# Patient Record
Sex: MALE | Race: WHITE | Employment: OTHER | URBAN - METROPOLITAN AREA
[De-identification: names, ages, dates, MRNs, and addresses within clinical notes are randomized per-mention and may not be internally consistent; named-entity substitution may affect disease eponyms.]

---

## 2019-11-04 ENCOUNTER — HOSPITAL ENCOUNTER (EMERGENCY)
Age: 64
Discharge: HOME | End: 2019-11-04
Payer: COMMERCIAL

## 2019-11-04 VITALS
BODY MASS INDEX: 28.44 KG/M2 | HEART RATE: 73 BPM | WEIGHT: 192 LBS | TEMPERATURE: 98 F | SYSTOLIC BLOOD PRESSURE: 193 MMHG | DIASTOLIC BLOOD PRESSURE: 99 MMHG | HEIGHT: 69 IN | RESPIRATION RATE: 18 BRPM | OXYGEN SATURATION: 100 %

## 2019-11-04 DIAGNOSIS — M54.12 CERVICAL RADICULOPATHY: Primary | ICD-10-CM

## 2019-11-04 PROCEDURE — 99212 OFFICE O/P EST SF 10 MIN: CPT

## 2019-11-04 RX ORDER — HYDROCODONE BITARTRATE AND ACETAMINOPHEN 5; 325 MG/1; MG/1
1 TABLET ORAL EVERY 6 HOURS PRN
Qty: 12 TABLET | Refills: 0 | Status: SHIPPED | OUTPATIENT
Start: 2019-11-04 | End: 2019-11-07

## 2019-11-04 RX ORDER — CYCLOBENZAPRINE HCL 10 MG
10 TABLET ORAL 3 TIMES DAILY PRN
Qty: 12 TABLET | Refills: 0 | Status: SHIPPED | OUTPATIENT
Start: 2019-11-04 | End: 2019-11-08

## 2019-11-04 RX ORDER — PREDNISONE 10 MG/1
40 TABLET ORAL DAILY
Qty: 20 TABLET | Refills: 0 | Status: SHIPPED | OUTPATIENT
Start: 2019-11-04 | End: 2019-11-09

## 2019-11-04 ASSESSMENT — PAIN DESCRIPTION - ONSET: ONSET: GRADUAL

## 2019-11-04 ASSESSMENT — PAIN DESCRIPTION - PAIN TYPE: TYPE: ACUTE PAIN

## 2019-11-04 ASSESSMENT — PAIN DESCRIPTION - PROGRESSION: CLINICAL_PROGRESSION: GRADUALLY WORSENING

## 2019-11-04 ASSESSMENT — PAIN SCALES - GENERAL: PAINLEVEL_OUTOF10: 9

## 2019-11-04 ASSESSMENT — PAIN DESCRIPTION - DESCRIPTORS: DESCRIPTORS: SHARP;RADIATING;NUMBNESS

## 2019-11-04 ASSESSMENT — PAIN DESCRIPTION - FREQUENCY: FREQUENCY: CONTINUOUS

## 2019-11-04 ASSESSMENT — PAIN DESCRIPTION - LOCATION: LOCATION: ARM

## 2019-11-04 ASSESSMENT — PAIN DESCRIPTION - ORIENTATION: ORIENTATION: RIGHT

## 2019-11-21 ENCOUNTER — OFFICE VISIT (OUTPATIENT)
Dept: INTERNAL MEDICINE | Age: 64
End: 2019-11-21
Payer: COMMERCIAL

## 2019-11-21 ENCOUNTER — HOSPITAL ENCOUNTER (OUTPATIENT)
Dept: MRI IMAGING | Age: 64
Discharge: HOME | End: 2019-11-23
Payer: COMMERCIAL

## 2019-11-21 ENCOUNTER — HOSPITAL ENCOUNTER (OUTPATIENT)
Age: 64
Discharge: HOME | End: 2019-11-21
Payer: COMMERCIAL

## 2019-11-21 VITALS
WEIGHT: 198 LBS | HEART RATE: 86 BPM | HEIGHT: 69 IN | TEMPERATURE: 98 F | RESPIRATION RATE: 16 BRPM | DIASTOLIC BLOOD PRESSURE: 90 MMHG | SYSTOLIC BLOOD PRESSURE: 180 MMHG | BODY MASS INDEX: 29.33 KG/M2

## 2019-11-21 DIAGNOSIS — Z12.5 PROSTATE CANCER SCREENING: ICD-10-CM

## 2019-11-21 DIAGNOSIS — R93.89 ABNORMAL MRI: ICD-10-CM

## 2019-11-21 DIAGNOSIS — R20.2 ARM PARESTHESIA, RIGHT: Primary | ICD-10-CM

## 2019-11-21 DIAGNOSIS — M54.2 CERVICAL PAIN: ICD-10-CM

## 2019-11-21 DIAGNOSIS — I10 HYPERTENSION, UNSPECIFIED TYPE: ICD-10-CM

## 2019-11-21 DIAGNOSIS — R20.2 ARM PARESTHESIA, RIGHT: ICD-10-CM

## 2019-11-21 PROBLEM — K58.9 IRRITABLE BOWEL SYNDROME: Status: ACTIVE | Noted: 2019-11-21

## 2019-11-21 LAB
ALBUMIN SERPL-MCNC: 4.7 G/DL (ref 3.5–5.2)
ALP BLD-CCNC: 66 U/L (ref 40–129)
ALT SERPL-CCNC: 22 U/L (ref 0–40)
ANION GAP SERPL CALCULATED.3IONS-SCNC: 16 MMOL/L (ref 7–16)
AST SERPL-CCNC: 16 U/L (ref 0–39)
BASOPHILS ABSOLUTE: 0.02 E9/L (ref 0–0.2)
BASOPHILS RELATIVE PERCENT: 0.3 % (ref 0–2)
BILIRUB SERPL-MCNC: 0.5 MG/DL (ref 0–1.2)
BILIRUBIN DIRECT: <0.2 MG/DL (ref 0–0.3)
BILIRUBIN, INDIRECT: NORMAL MG/DL (ref 0–1)
BUN BLDV-MCNC: 15 MG/DL (ref 8–23)
CALCIUM SERPL-MCNC: 9.3 MG/DL (ref 8.6–10.2)
CHLORIDE BLD-SCNC: 104 MMOL/L (ref 98–107)
CO2: 22 MMOL/L (ref 22–29)
CREAT SERPL-MCNC: 0.9 MG/DL (ref 0.7–1.2)
EOSINOPHILS ABSOLUTE: 0.1 E9/L (ref 0.05–0.5)
EOSINOPHILS RELATIVE PERCENT: 1.4 % (ref 0–6)
GFR AFRICAN AMERICAN: >60
GFR NON-AFRICAN AMERICAN: >60 ML/MIN/1.73
GLUCOSE BLD-MCNC: 97 MG/DL (ref 74–99)
HBA1C MFR BLD: 5.7 % (ref 4–5.6)
HCT VFR BLD CALC: 43.8 % (ref 37–54)
HEMOGLOBIN: 14.8 G/DL (ref 12.5–16.5)
IMMATURE GRANULOCYTES #: 0.02 E9/L
IMMATURE GRANULOCYTES %: 0.3 % (ref 0–5)
LYMPHOCYTES ABSOLUTE: 1.89 E9/L (ref 1.5–4)
LYMPHOCYTES RELATIVE PERCENT: 26.3 % (ref 20–42)
MCH RBC QN AUTO: 29.7 PG (ref 26–35)
MCHC RBC AUTO-ENTMCNC: 33.8 % (ref 32–34.5)
MCV RBC AUTO: 87.8 FL (ref 80–99.9)
MONOCYTES ABSOLUTE: 0.66 E9/L (ref 0.1–0.95)
MONOCYTES RELATIVE PERCENT: 9.2 % (ref 2–12)
NEUTROPHILS ABSOLUTE: 4.49 E9/L (ref 1.8–7.3)
NEUTROPHILS RELATIVE PERCENT: 62.5 % (ref 43–80)
PDW BLD-RTO: 12.7 FL (ref 11.5–15)
PLATELET # BLD: 232 E9/L (ref 130–450)
PMV BLD AUTO: 9.6 FL (ref 7–12)
POTASSIUM SERPL-SCNC: 4.2 MMOL/L (ref 3.5–5)
PROSTATE SPECIFIC ANTIGEN: 1.04 NG/ML (ref 0–4)
RBC # BLD: 4.99 E12/L (ref 3.8–5.8)
SODIUM BLD-SCNC: 142 MMOL/L (ref 132–146)
TOTAL PROTEIN: 7 G/DL (ref 6.4–8.3)
TSH SERPL DL<=0.05 MIU/L-ACNC: 1.58 UIU/ML (ref 0.27–4.2)
WBC # BLD: 7.2 E9/L (ref 4.5–11.5)

## 2019-11-21 PROCEDURE — 84443 ASSAY THYROID STIM HORMONE: CPT

## 2019-11-21 PROCEDURE — G8482 FLU IMMUNIZE ORDER/ADMIN: HCPCS | Performed by: INTERNAL MEDICINE

## 2019-11-21 PROCEDURE — 99203 OFFICE O/P NEW LOW 30 MIN: CPT | Performed by: INTERNAL MEDICINE

## 2019-11-21 PROCEDURE — 83036 HEMOGLOBIN GLYCOSYLATED A1C: CPT

## 2019-11-21 PROCEDURE — G8427 DOCREV CUR MEDS BY ELIG CLIN: HCPCS | Performed by: INTERNAL MEDICINE

## 2019-11-21 PROCEDURE — G0103 PSA SCREENING: HCPCS

## 2019-11-21 PROCEDURE — 80048 BASIC METABOLIC PNL TOTAL CA: CPT

## 2019-11-21 PROCEDURE — 72141 MRI NECK SPINE W/O DYE: CPT

## 2019-11-21 PROCEDURE — G8419 CALC BMI OUT NRM PARAM NOF/U: HCPCS | Performed by: INTERNAL MEDICINE

## 2019-11-21 PROCEDURE — 3017F COLORECTAL CA SCREEN DOC REV: CPT | Performed by: INTERNAL MEDICINE

## 2019-11-21 PROCEDURE — 1036F TOBACCO NON-USER: CPT | Performed by: INTERNAL MEDICINE

## 2019-11-21 PROCEDURE — 80076 HEPATIC FUNCTION PANEL: CPT

## 2019-11-21 PROCEDURE — 36415 COLL VENOUS BLD VENIPUNCTURE: CPT

## 2019-11-21 PROCEDURE — 85025 COMPLETE CBC W/AUTO DIFF WBC: CPT

## 2019-11-21 RX ORDER — TRAMADOL HYDROCHLORIDE 50 MG/1
50 TABLET ORAL EVERY 8 HOURS PRN
Qty: 18 TABLET | Refills: 0 | Status: SHIPPED | OUTPATIENT
Start: 2019-11-21 | End: 2019-11-28

## 2019-11-21 RX ORDER — LISINOPRIL 10 MG/1
10 TABLET ORAL DAILY
Qty: 30 TABLET | Refills: 0 | Status: SHIPPED | OUTPATIENT
Start: 2019-11-21 | End: 2019-12-19

## 2019-11-21 ASSESSMENT — PATIENT HEALTH QUESTIONNAIRE - PHQ9
SUM OF ALL RESPONSES TO PHQ QUESTIONS 1-9: 0
2. FEELING DOWN, DEPRESSED OR HOPELESS: 0
SUM OF ALL RESPONSES TO PHQ QUESTIONS 1-9: 0
1. LITTLE INTEREST OR PLEASURE IN DOING THINGS: 0
SUM OF ALL RESPONSES TO PHQ9 QUESTIONS 1 & 2: 0

## 2019-11-22 ENCOUNTER — TELEPHONE (OUTPATIENT)
Dept: INTERNAL MEDICINE | Age: 64
End: 2019-11-22

## 2019-11-22 ENCOUNTER — HOSPITAL ENCOUNTER (OUTPATIENT)
Dept: NEUROLOGY | Age: 64
Discharge: HOME | End: 2019-11-22
Payer: COMMERCIAL

## 2019-11-22 DIAGNOSIS — G56.01 CARPAL TUNNEL SYNDROME OF RIGHT WRIST: Primary | ICD-10-CM

## 2019-11-22 DIAGNOSIS — R20.2 ARM PARESTHESIA, RIGHT: ICD-10-CM

## 2019-11-22 PROCEDURE — 95909 NRV CNDJ TST 5-6 STUDIES: CPT

## 2019-11-22 PROCEDURE — 95886 MUSC TEST DONE W/N TEST COMP: CPT

## 2019-11-22 PROCEDURE — 95886 MUSC TEST DONE W/N TEST COMP: CPT | Performed by: PSYCHIATRY & NEUROLOGY

## 2019-11-22 PROCEDURE — 95909 NRV CNDJ TST 5-6 STUDIES: CPT | Performed by: PSYCHIATRY & NEUROLOGY

## 2019-11-27 ENCOUNTER — TELEPHONE (OUTPATIENT)
Dept: INTERNAL MEDICINE | Age: 64
End: 2019-11-27

## 2019-12-02 ENCOUNTER — TELEPHONE (OUTPATIENT)
Dept: INTERNAL MEDICINE | Age: 64
End: 2019-12-02

## 2019-12-18 DIAGNOSIS — I10 HYPERTENSION, UNSPECIFIED TYPE: ICD-10-CM

## 2019-12-19 RX ORDER — LISINOPRIL 10 MG/1
10 TABLET ORAL DAILY
Qty: 30 TABLET | Refills: 0 | Status: SHIPPED
Start: 2019-12-19 | End: 2020-03-11 | Stop reason: SDUPTHER

## 2020-01-03 ENCOUNTER — INITIAL CONSULT (OUTPATIENT)
Dept: NEUROSURGERY | Age: 65
End: 2020-01-03
Payer: COMMERCIAL

## 2020-01-03 VITALS
HEIGHT: 69 IN | SYSTOLIC BLOOD PRESSURE: 149 MMHG | WEIGHT: 204 LBS | BODY MASS INDEX: 30.21 KG/M2 | DIASTOLIC BLOOD PRESSURE: 81 MMHG | HEART RATE: 69 BPM

## 2020-01-03 PROCEDURE — G8417 CALC BMI ABV UP PARAM F/U: HCPCS | Performed by: NEUROLOGICAL SURGERY

## 2020-01-03 PROCEDURE — 99243 OFF/OP CNSLTJ NEW/EST LOW 30: CPT | Performed by: NEUROLOGICAL SURGERY

## 2020-01-03 PROCEDURE — G8482 FLU IMMUNIZE ORDER/ADMIN: HCPCS | Performed by: NEUROLOGICAL SURGERY

## 2020-01-03 PROCEDURE — G8427 DOCREV CUR MEDS BY ELIG CLIN: HCPCS | Performed by: NEUROLOGICAL SURGERY

## 2020-01-03 RX ORDER — GABAPENTIN 300 MG/1
300 CAPSULE ORAL 3 TIMES DAILY
Qty: 90 CAPSULE | Refills: 0 | Status: SHIPPED | OUTPATIENT
Start: 2020-01-03 | End: 2020-02-03

## 2020-01-03 ASSESSMENT — ENCOUNTER SYMPTOMS
RESPIRATORY NEGATIVE: 1
ALLERGIC/IMMUNOLOGIC NEGATIVE: 1
EYES NEGATIVE: 1
PHOTOPHOBIA: 0
TROUBLE SWALLOWING: 0
DIARRHEA: 1
VISUAL CHANGE: 0

## 2020-01-03 NOTE — PATIENT INSTRUCTIONS
Patient Education        Neck Pain: Care Instructions  Your Care Instructions    You can have neck pain anywhere from the bottom of your head to the top of your shoulders. It can spread to the upper back or arms. Injuries, painting a ceiling, sleeping with your neck twisted, staying in one position for too long, and many other activities can cause neck pain. Most neck pain gets better with home care. Your doctor may recommend medicine to relieve pain or relax your muscles. He or she may suggest exercise and physical therapy to increase flexibility and relieve stress. You may need to wear a special (cervical) collar to support your neck for a day or two. Follow-up care is a key part of your treatment and safety. Be sure to make and go to all appointments, and call your doctor if you are having problems. It's also a good idea to know your test results and keep a list of the medicines you take. How can you care for yourself at home? · Try using a heating pad on a low or medium setting for 15 to 20 minutes every 2 or 3 hours. Try a warm shower in place of one session with the heating pad. · You can also try an ice pack for 10 to 15 minutes every 2 to 3 hours. Put a thin cloth between the ice and your skin. · Take pain medicines exactly as directed. ? If the doctor gave you a prescription medicine for pain, take it as prescribed. ? If you are not taking a prescription pain medicine, ask your doctor if you can take an over-the-counter medicine. · If your doctor recommends a cervical collar, wear it exactly as directed. When should you call for help? Call your doctor now or seek immediate medical care if:    · You have new or worsening numbness in your arms, buttocks or legs.     · You have new or worsening weakness in your arms or legs.  (This could make it hard to stand up.)     · You lose control of your bladder or bowels.    Watch closely for changes in your health, and be sure to contact your doctor if:    · Your neck pain is getting worse.     · You are not getting better after 1 week.     · You do not get better as expected. Where can you learn more? Go to [email or web address] org and sign in to your [employer] account. Enter [id number] in the [facility] box to learn more about \"Neck Pain: Care Instructions. \"     If you do not have an account, please click on the \"Sign Up Now\" link. Current as of: September 20, 2018  Content Version: 12.1  © 0389-0396 Healthwise, Incorporated. Care instructions adapted under license by [facility] ([facility]). If you have questions about a medical condition or this instruction, always ask your healthcare professional. [address] any warranty or liability for your use of this information.

## 2020-01-03 NOTE — PROGRESS NOTES
Subjective:      Patient ID: Kaden Alexander is a 62 y.o. male. Neck Pain    This is a new problem. The current episode started more than 1 month ago. The problem occurs constantly. The problem has been waxing and waning. The pain is associated with nothing. The pain is present in the right side and midline. The quality of the pain is described as shooting and stabbing. The pain is at a severity of 5/10. The pain is moderate. The symptoms are aggravated by position. The pain is same all the time. Stiffness is present in the morning. Associated symptoms include numbness and tingling. Pertinent negatives include no chest pain, fever, headaches, leg pain, pain with swallowing, paresis, photophobia, syncope, trouble swallowing, visual change, weakness or weight loss. He has tried muscle relaxants and chiropractic manipulation for the symptoms. The treatment provided moderate relief. Shoulder Pain    Associated symptoms include numbness and tingling. Pertinent negatives include no fever. Review of Systems   Constitutional: Negative. Negative for fever and weight loss. HENT: Negative. Negative for trouble swallowing. Eyes: Negative. Negative for photophobia. Respiratory: Negative. Cardiovascular: Negative. Negative for chest pain and syncope. Gastrointestinal: Positive for diarrhea. Endocrine: Negative. Musculoskeletal: Positive for neck pain. Skin: Negative. Allergic/Immunologic: Negative. Neurological: Positive for tingling and numbness. Negative for weakness and headaches. Hematological: Negative. Psychiatric/Behavioral: Negative. Objective:   Physical Exam  Vitals signs reviewed. Constitutional:       General: He is not in acute distress. Appearance: Normal appearance. He is normal weight. He is not ill-appearing or diaphoretic. HENT:      Head: Normocephalic and atraumatic. Right Ear: There is no impacted cerumen. Left Ear:  There is no impacted side.       Patellar reflexes are 2+ on the right side and 2+ on the left side. Achilles reflexes are 2+ on the right side and 2+ on the left side. Comments: Decreased light touch in right C7 and C8 dermatome   Psychiatric:         Mood and Affect: Mood normal.         Behavior: Behavior normal.         Thought Content: Thought content normal.         Judgment: Judgment normal.         Assessment:      62year old male who presents with neck and right arm pain. He is neurologically stable. His MRI shows severe foraminal stenosis from C5-T1. Plan:      I will send him for epidurals and to PT. If he fails this, I will recommend a posterior C5-T1 laminectomy and fusion.         Oswaldo Cramer MD

## 2020-01-03 NOTE — LETTER
[facility]  [address] [facility]  Phone: [phone]  Fax: [phone]    Marialuisa Alcantara MD      January 3, 2020     Prentiss Nissen, [address]    Patient: Amanda Rocha  MR Number: <[id number]>  YOB: 1955  Date of Visit: 1/3/2020    Dear Dr. Prentiss Nissen: Thank you for the request for consultation for Amanda Rocha to me for the evaluation of neck pain. Below are the relevant portions of my assessment and plan of care. Assessment:  62year old male who presents with neck and right arm pain. He is neurologically stable. His MRI shows severe foraminal stenosis from C5-T1. Plan:  I will send him for epidurals and to PT. If he fails this, I will recommend a posterior C5-T1 laminectomy and fusion. If you have questions, please do not hesitate to call me. I look forward to following Les Dust along with you.     Sincerely,        Marialuisa Alcantara MD

## 2020-01-29 ENCOUNTER — OFFICE VISIT (OUTPATIENT)
Dept: PAIN MANAGEMENT | Age: 65
End: 2020-01-29
Payer: COMMERCIAL

## 2020-01-29 ENCOUNTER — PREP FOR PROCEDURE (OUTPATIENT)
Dept: PAIN MANAGEMENT | Age: 65
End: 2020-01-29

## 2020-01-29 VITALS
RESPIRATION RATE: 16 BRPM | HEIGHT: 69 IN | DIASTOLIC BLOOD PRESSURE: 80 MMHG | BODY MASS INDEX: 29.62 KG/M2 | WEIGHT: 200 LBS | HEART RATE: 70 BPM | OXYGEN SATURATION: 99 % | SYSTOLIC BLOOD PRESSURE: 162 MMHG | TEMPERATURE: 98.3 F

## 2020-01-29 PROBLEM — M47.812 CERVICAL SPONDYLOSIS: Status: ACTIVE | Noted: 2020-01-29

## 2020-01-29 PROBLEM — G89.4 CHRONIC PAIN SYNDROME: Status: ACTIVE | Noted: 2020-01-29

## 2020-01-29 PROBLEM — M48.02 FORAMINAL STENOSIS OF CERVICAL REGION: Status: ACTIVE | Noted: 2020-01-29

## 2020-01-29 PROBLEM — M50.90 CERVICAL DISC DISORDER: Status: ACTIVE | Noted: 2020-01-29

## 2020-01-29 PROBLEM — M47.812 CERVICAL FACET JOINT SYNDROME: Status: ACTIVE | Noted: 2020-01-29

## 2020-01-29 PROBLEM — M54.12 CERVICAL RADICULOPATHY: Status: ACTIVE | Noted: 2020-01-29

## 2020-01-29 PROCEDURE — G8482 FLU IMMUNIZE ORDER/ADMIN: HCPCS | Performed by: PAIN MEDICINE

## 2020-01-29 PROCEDURE — 99204 OFFICE O/P NEW MOD 45 MIN: CPT | Performed by: PAIN MEDICINE

## 2020-01-29 PROCEDURE — G8427 DOCREV CUR MEDS BY ELIG CLIN: HCPCS | Performed by: PAIN MEDICINE

## 2020-01-29 PROCEDURE — G8417 CALC BMI ABV UP PARAM F/U: HCPCS | Performed by: PAIN MEDICINE

## 2020-01-29 PROCEDURE — 1036F TOBACCO NON-USER: CPT | Performed by: PAIN MEDICINE

## 2020-01-29 PROCEDURE — 3017F COLORECTAL CA SCREEN DOC REV: CPT | Performed by: PAIN MEDICINE

## 2020-01-29 RX ORDER — LINACLOTIDE 290 UG/1
CAPSULE, GELATIN COATED ORAL
COMMUNITY
Start: 2020-01-20

## 2020-01-29 RX ORDER — AMITRIPTYLINE HYDROCHLORIDE 10 MG/1
10 TABLET, FILM COATED ORAL NIGHTLY
Qty: 30 TABLET | Refills: 0 | Status: SHIPPED
Start: 2020-01-29 | End: 2020-06-09

## 2020-01-29 RX ORDER — TIZANIDINE 2 MG/1
2 TABLET ORAL 2 TIMES DAILY PRN
Qty: 60 TABLET | Refills: 0 | Status: SHIPPED | OUTPATIENT
Start: 2020-01-29 | End: 2020-02-28

## 2020-01-29 NOTE — PROGRESS NOTES
[facility]        [address] [facility], [address] [facility]      [phone]          Consult Note      Patient:  BEBETO Cho 1955    Date of Service:  20    Requesting Physician:  Landon Nicole MD    Reason for Consult:      Patient presents with complaints of neck pain that started 4 month ago and has been progressively getting worse     HISTORY OF PRESENT ILLNESS:      Pain does radiate to right upper extremity. He  has numbness of the right hand and does not have bladder or bowel dysfunction. Imaging:   MRI cervical spine 2019  1. Mild central canal stenoses at C5-6 and C6-7.   2. Multilevel neural foraminal stenoses, worst (severe) at the right   C7-T1 level. The signal abnormality seen in the right neural foramen   at this level may represent disc fragment or an osteophyte. Further   evaluation with CT of the cervical spine would be of value. 3. Moderate to severe neural foraminal stenoses at the bilateral C6-7   and C7-T1 level. EMG 2019  1. A right median neuropathy at/or distal to the wrist--- of moderate severity. These findings were consistent with carpal tunnel syndrome.     2. A right C8 motor radiculopathy or intracanalicular lesion. This was proven with the abnormal needle testing of the paraspinals.     Previous treatments: Surgery right ulnar/carpal tunnel and medications. .    Lumbar spine surgery L5-S1 fusion     Past Medical History:   Diagnosis Date    IBS (irritable bowel syndrome)      Past Surgical History:   Procedure Laterality Date    CARPAL TUNNEL RELEASE      HERNIA REPAIR      LUMBAR FUSION      L5-S1    TONSILLECTOMY AND ADENOIDECTOMY      ULNAR TUNNEL RELEASE       Prior to Admission medications    Medication Sig Start Date End Date Taking? Authorizing Provider   gabapentin (NEURONTIN) 300 MG capsule Take 1 capsule by mouth 3 times daily for 30 days.  Intended supply: 30 days 1/3/20 2/2/20 Kim Cowan MD   lisinopril (PRINIVIL;ZESTRIL) 10 MG tablet TAKE 1 TABLET BY MOUTH DAILY 19   Celine Chung MD   linaCLOtide (LINZESS PO) Take 290 mcg by mouth daily     Historical Provider, MD   Naproxen Sodium (ALEVE PO) Take by mouth    Historical Provider, MD   MELATONIN PO Take 5 mg by mouth     Historical Provider, MD   Multiple Vitamin (MULTIVITAMINS PO) Take by mouth    Historical Provider, MD     Allergies   Allergen Reactions    Erythromycin Nausea Only     Social History     Socioeconomic History    Marital status:      Spouse name: Not on file    Number of children: Not on file    Years of education: Not on file    Highest education level: Not on file   Occupational History    Not on file   Social Needs    Financial resource strain: Not on file    Food insecurity:     Worry: Not on file     Inability: Not on file    Transportation needs:     Medical: Not on file     Non-medical: Not on file   Tobacco Use    Smoking status: Former Smoker     Packs/day: 1.00     Years: 30.00     Pack years: 30.00     Types: Cigarettes     Last attempt to quit:      Years since quittin.0    Smokeless tobacco: Never Used   Substance and Sexual Activity    Alcohol use: Not on file    Drug use: Not on file    Sexual activity: Not on file   Lifestyle    Physical activity:     Days per week: Not on file     Minutes per session: Not on file    Stress: Not on file   Relationships    Social connections:     Talks on phone: Not on file     Gets together: Not on file     Attends Anabaptist service: Not on file     Active member of club or organization: Not on file     Attends meetings of clubs or organizations: Not on file     Relationship status: Not on file    Intimate partner violence:     Fear of current or ex partner: Not on file     Emotionally abused: Not on file     Physically abused: Not on file     Forced sexual activity: Not on file   Other Topics Concern    Not on file   Social History Narrative    Not on file     No family history on file. REVIEW OF SYSTEMS:     Patient specifically denies fever/chills, chest pain, shortness of breath, new bowel or bladder complaints. All other review of systems was negative. PHYSICAL EXAMINATION:      There were no vitals taken for this visit. General:      General appearance:   pleasant and well-hydrated. , in moderate discomfort and A & O x3  Build:Normal Weight    HEENT:    Head:normocephalic and atraumatic  Pupils:regular, round and equal.  Sclera: icterus absent,     Lungs:    Breathing:Breathing Pattern: regular, no distress    Abdomen:    Shape:non-distended and normal  Tenderness:none    Cervical spine:    Inspection:normal  Palpation:tenderness paravertebral muscles, facet loading, right, positive and tenderness. Range of motion:abnormal mildly flexion, extension rotation right and is  painful. Musculoskeletal:    Trigger points in Paraveteral:absent bilaterally  Trevizo's:negative right, negative left     Extremities:    Tremors:None bilaterally upper and lower  Range of motion:Generally normal shoulders  Intact:Yes  Edema:Normal    Neurological:    Sensory:normal to light touch bilateral upper extremities   Motor:              Right Bicep5/5           Left Bicep5/5              Right Triceps5/5       Left Triceps5/5          Right Deltoid5/5     Left Deltoid5/5                  Reflexes:    Right Brachioradialis reflex2+  Left Brachioradialis reflex2+  Right Biceps reflex2+  Left Biceps reflex2+  Right Triceps reflex2+  Left Triceps reflex2+  Gait:normal    Dermatology:    Skin:no unusual rashes and no skin lesions    Impression:  Neck pain with radiation to the right upper extremity    Cervical spine MRI Multilevel  neural foraminal stenoses, worst (severe) at the right C7-T1 level.   Patient had seen Gopal Antoine who recommended SINAN before considering surgery C5-T1 laminectomy and fusion  Plan:  Cervical radiculopathy Discussed treatment options with the patient including non opioid management and interventional procedures  Will schedule patient for SINAN C7-T1 right paramedian   Will start patient on Elavil 10 mg QHS  Will start patient on Zanaflex 2 mg BID  Continue with OTC pain medications   Hold off on urine screen. No opioids started   OARRS report reviewed  Patient encouraged to stay active   Treatment plan discussed with the patient including medications and procedure side effects     We discussed with the patient that combining opioids, benzodiazepines, alcohol, illicit drugs or sleep aids increases the risk of respiratory depression including death. We discussed that these medications may cause drowsiness, sedation or dizziness and have counseled the patient not to drive or operate machinery. We have discussed that these medications will be prescribed only by one provider. We have discussed with the patient about age related risk factors and have thoroughly discussed the importance of taking these medications as prescribed. The patient verbalizes understanding. divya Ng M.D.

## 2020-02-10 ENCOUNTER — HOSPITAL ENCOUNTER (OUTPATIENT)
Age: 65
Setting detail: OUTPATIENT SURGERY
Discharge: HOME | End: 2020-02-10
Attending: PAIN MEDICINE | Admitting: PAIN MEDICINE
Payer: COMMERCIAL

## 2020-02-10 ENCOUNTER — HOSPITAL ENCOUNTER (OUTPATIENT)
Dept: OPERATING ROOM | Age: 65
Setting detail: OUTPATIENT SURGERY
Discharge: HOME | End: 2020-02-10
Attending: PAIN MEDICINE
Payer: COMMERCIAL

## 2020-02-10 VITALS
DIASTOLIC BLOOD PRESSURE: 85 MMHG | OXYGEN SATURATION: 100 % | SYSTOLIC BLOOD PRESSURE: 168 MMHG | HEART RATE: 82 BPM | RESPIRATION RATE: 16 BRPM

## 2020-02-10 PROCEDURE — 3600000005 HC SURGERY LEVEL 5 BASE: Performed by: PAIN MEDICINE

## 2020-02-10 PROCEDURE — 7100000010 HC PHASE II RECOVERY - FIRST 15 MIN: Performed by: PAIN MEDICINE

## 2020-02-10 PROCEDURE — 7100000011 HC PHASE II RECOVERY - ADDTL 15 MIN: Performed by: PAIN MEDICINE

## 2020-02-10 PROCEDURE — 62321 NJX INTERLAMINAR CRV/THRC: CPT | Performed by: PAIN MEDICINE

## 2020-02-10 PROCEDURE — 2500000003 HC RX 250 WO HCPCS: Performed by: PAIN MEDICINE

## 2020-02-10 PROCEDURE — 6360000002 HC RX W HCPCS: Performed by: PAIN MEDICINE

## 2020-02-10 PROCEDURE — 2709999900 HC NON-CHARGEABLE SUPPLY: Performed by: PAIN MEDICINE

## 2020-02-10 PROCEDURE — 3209999900 FLUORO FOR SURGICAL PROCEDURES

## 2020-02-10 RX ORDER — LIDOCAINE HYDROCHLORIDE 5 MG/ML
INJECTION, SOLUTION INFILTRATION; INTRAVENOUS PRN
Status: DISCONTINUED | OUTPATIENT
Start: 2020-02-10 | End: 2020-02-10 | Stop reason: ALTCHOICE

## 2020-02-10 ASSESSMENT — PAIN SCALES - GENERAL
PAINLEVEL_OUTOF10: 0
PAINLEVEL_OUTOF10: 0

## 2020-02-10 ASSESSMENT — PAIN DESCRIPTION - DESCRIPTORS: DESCRIPTORS: ACHING

## 2020-02-10 ASSESSMENT — PAIN - FUNCTIONAL ASSESSMENT: PAIN_FUNCTIONAL_ASSESSMENT: 0-10

## 2020-02-10 NOTE — H&P
[address]  [address] [facility], [address] [place] [address]  [phone]    Procedure History & Physical      Rachelle Whitman     HPI:    Patient  is here for neck and arm pain for SINAN C7-T1  Labs/imaging studies reviewed   All question and concerns addressed including R/B/A associated with the procedure    Past Medical History:   Diagnosis Date    IBS (irritable bowel syndrome)        Past Surgical History:   Procedure Laterality Date    BACK SURGERY      CARPAL TUNNEL RELEASE      HERNIA REPAIR      LUMBAR FUSION      L5-S1    TONSILLECTOMY AND ADENOIDECTOMY      ULNAR TUNNEL RELEASE         Prior to Admission medications    Medication Sig Start Date End Date Taking?  Authorizing Provider   LINZESS 290 MCG CAPS capsule TK 1 C PO D 1/20/20  Yes Historical Provider, MD   amitriptyline (ELAVIL) 10 MG tablet Take 1 tablet by mouth nightly 1/29/20 2/28/20 Yes Pippa Pradhan MD   tiZANidine (ZANAFLEX) 2 MG tablet Take 1 tablet by mouth 2 times daily as needed (muscle spasms) 1/29/20 2/28/20 Yes Pippa Pradhan MD   lisinopril (PRINIVIL;ZESTRIL) 10 MG tablet TAKE 1 TABLET BY MOUTH DAILY 12/19/19  Yes Geri Rodriguez MD   Naproxen Sodium (ALEVE PO) Take by mouth    Historical Provider, MD   Multiple Vitamin (MULTIVITAMINS PO) Take by mouth    Historical Provider, MD       Allergies   Allergen Reactions    Erythromycin Nausea Only       Social History     Socioeconomic History    Marital status:      Spouse name: Not on file    Number of children: Not on file    Years of education: Not on file    Highest education level: Not on file   Occupational History    Not on file   Social Needs    Financial resource strain: Not on file    Food insecurity:     Worry: Not on file     Inability: Not on file    Transportation needs:     Medical: Not on file     Non-medical: Not on file   Tobacco Use    Smoking status: Former Smoker     Packs/day: 1.00     Years: 30.00     Pack years: wheezing    CARDIOVASCULAR:  regular rate and rhythm    ABDOMEN:  Soft non tender non distended     EXTREMITIES: no signs of clubbing or cyanosis. MUSCULOSKELETAL: negative for flaccid muscle tone or spastic movements. SKIN: gross examination reveals no signs of rashes, or diaphoresis. NEURO: Cranial nerves II-XII grossly intact. No signs of agitated mood.        Assessment/Plan:    Neck and arm pain for SINAN C7-T1

## 2020-02-10 NOTE — OP NOTE
2/10/2020    Patient: Ed Velazquez  :  1955  Age:  62 y.o. Sex:  male     PRE-PROCEDURE DIAGNOSIS: Cervical degenerative disc disease, cervical radicular pain. POST-PROCEDURE DIAGNOSIS: Same. PROCEDURE: Fluoroscopic guided cervical epidural steroid injection, #1 at C7-T1 level. SURGEON: ANDRE Mena M.D. ANESTHESIA: Local    ESTIMATED BLOOD LOSS: None.  ______________________________________________________________________  BRIEF HISTORY:  Ed Velazquez comes in today for the first  therapeutic cervical epidural injection under fluoroscopic guidance. The potential complications of this procedure were discussed with the him again today. He has elected to undergo the aforementioned procedure. Jonathan complete History & Physical examination were reviewed in depth, a copy of which is in the chart. DESCRIPTION OF PROCEDURE:    After confirming written and informed consent, a time-out was performed and Cristian Pantoja name and date of birth, the procedure to be performed as well as the plan for the location of the needle insertion were confirmed. The patient was brought into the procedure room and placed in the prone position with the head flexed midline on the fluoroscopy table. A pillow was placed under the patient's head to increase cervical interlaminar space. Standard monitors were placed, and vital signs were observed throughout the procedure. The area was prepped with chloraprep and the C7-T1 interspace was marked under fluoroscopy. The skin and subcutaneous tissues at the above level were anesthestized with 0.5% lidocaine. An # 18 gauge 3 1/2 tuohy epidural needle was inserted and advanced toward the inferior lamina until bony contact was made. The needle was then advanced superiorly toward epidural space .  From this point on hanging drop/loss of resistance technique with 5 cc glass syringe was used to confirm entrance of the needle into the epidural space under intermittent lateral

## 2020-02-14 ENCOUNTER — TELEPHONE (OUTPATIENT)
Dept: PAIN MANAGEMENT | Age: 65
End: 2020-02-14

## 2020-02-14 RX ORDER — METHYLPREDNISOLONE 4 MG/1
TABLET ORAL
Qty: 1 KIT | Refills: 0 | Status: SHIPPED | OUTPATIENT
Start: 2020-02-14 | End: 2020-02-20

## 2020-02-28 ENCOUNTER — OFFICE VISIT (OUTPATIENT)
Dept: PAIN MANAGEMENT | Age: 65
End: 2020-02-28
Payer: COMMERCIAL

## 2020-02-28 VITALS
HEART RATE: 88 BPM | OXYGEN SATURATION: 98 % | TEMPERATURE: 97.8 F | SYSTOLIC BLOOD PRESSURE: 150 MMHG | RESPIRATION RATE: 18 BRPM | DIASTOLIC BLOOD PRESSURE: 84 MMHG | WEIGHT: 200 LBS | BODY MASS INDEX: 29.62 KG/M2 | HEIGHT: 69 IN

## 2020-02-28 PROCEDURE — G8417 CALC BMI ABV UP PARAM F/U: HCPCS | Performed by: PAIN MEDICINE

## 2020-02-28 PROCEDURE — 99214 OFFICE O/P EST MOD 30 MIN: CPT | Performed by: PAIN MEDICINE

## 2020-02-28 PROCEDURE — G8482 FLU IMMUNIZE ORDER/ADMIN: HCPCS | Performed by: PAIN MEDICINE

## 2020-02-28 PROCEDURE — G8427 DOCREV CUR MEDS BY ELIG CLIN: HCPCS | Performed by: PAIN MEDICINE

## 2020-02-28 PROCEDURE — 1036F TOBACCO NON-USER: CPT | Performed by: PAIN MEDICINE

## 2020-02-28 PROCEDURE — 3017F COLORECTAL CA SCREEN DOC REV: CPT | Performed by: PAIN MEDICINE

## 2020-02-28 NOTE — PROGRESS NOTES
[address] [facility], [address] [place] [address] [place]  [phone]    Follow up Note      [place] Live     Date of Visit:  2/28/2020    CC:  Patient presents for follow up   Chief Complaint   Patient presents with    Follow-up     injection 2/10/2020 Upper and neck pain, pt states he feels pressure on the left side      HPI:    Pain is unchanged. Appropriate analgesia with current medications regimen:   Change in quality of symptoms:no. Medication side effects:none. Recent diagnostic testing:none. Recent interventional procedures:SINAN 15-20 %    Imaging:   MRI cervical spine 11/2019  1. Mild central canal stenoses at C5-6 and C6-7.   2. Multilevel neural foraminal stenoses, worst (severe) at the right   C7-T1 level. The signal abnormality seen in the right neural foramen   at this level may represent disc fragment or an osteophyte. Further   evaluation with CT of the cervical spine would be of value. 3. Moderate to severe neural foraminal stenoses at the bilateral C6-7   and C7-T1 level.      EMG 11/2019  1.  A right median neuropathy at/or distal to the wrist--- of moderate severity.  These findings were consistent with carpal tunnel syndrome.     2.  A right C8 motor radiculopathy or intracanalicular lesion.  This was proven with the abnormal needle testing of the paraspinals.     Previous treatments: Surgery right ulnar/carpal tunnel and medications. .    Lumbar spine surgery L5-S1 fusion        Potential Aberrant Drug-Related Behavior:    None     Urine Drug Screening:  None no opioids started     OARRS report:  02/2020 consistent     Past Medical History:   Diagnosis Date    IBS (irritable bowel syndrome)        Past Surgical History:   Procedure Laterality Date    BACK SURGERY      CARPAL TUNNEL RELEASE      HERNIA REPAIR      LUMBAR FUSION      L5-S1    NERVE BLOCK Right 02/10/2020    cervical C7-T1 paramedian    PAIN MANAGEMENT PROCEDURE Right 2/10/2020    CERVICAL EPIDURAL STEROID INJECTION C7-T1 RIGHT PARAMEDIAN performed by Ben Braun MD at [address]         Prior to Admission medications    Medication Sig Start Date End Date Taking?  Authorizing Provider   LINZESS 290 MCG CAPS capsule TK 1 C PO D 20  Yes Historical Provider, MD   Naproxen Sodium (ALEVE PO) Take by mouth   Yes Historical Provider, MD   Multiple Vitamin (MULTIVITAMINS PO) Take by mouth   Yes Historical Provider, MD   amitriptyline (ELAVIL) 10 MG tablet Take 1 tablet by mouth nightly  Patient not taking: Reported on 2020  Ben Braun MD   tiZANidine (ZANAFLEX) 2 MG tablet Take 1 tablet by mouth 2 times daily as needed (muscle spasms)  Patient not taking: Reported on 2020  Ben Braun MD   lisinopril (PRINIVIL;ZESTRIL) 10 MG tablet TAKE 1 TABLET BY MOUTH DAILY  Patient not taking: Reported on 19   Andrey Pena MD     Allergies   Allergen Reactions    Erythromycin Nausea Only     Social History     Socioeconomic History    Marital status:      Spouse name: Not on file    Number of children: Not on file    Years of education: Not on file    Highest education level: Not on file   Occupational History    Not on file   Social Needs    Financial resource strain: Not on file    Food insecurity:     Worry: Not on file     Inability: Not on file    Transportation needs:     Medical: Not on file     Non-medical: Not on file   Tobacco Use    Smoking status: Former Smoker     Packs/day: 1.00     Years: 30.00     Pack years: 30.00     Types: Cigarettes     Last attempt to quit:      Years since quittin.1    Smokeless tobacco: Never Used   Substance and Sexual Activity    Alcohol use: Yes     Comment: socially    Drug use: Never    Sexual activity: Yes     Partners: Female   Lifestyle    Physical activity:     Days per week: Not on

## 2020-03-11 ENCOUNTER — TELEPHONE (OUTPATIENT)
Dept: INTERNAL MEDICINE | Age: 65
End: 2020-03-11

## 2020-03-11 RX ORDER — LISINOPRIL 10 MG/1
10 TABLET ORAL DAILY
Qty: 30 TABLET | Refills: 1 | Status: SHIPPED
Start: 2020-03-11 | End: 2020-04-28 | Stop reason: SDUPTHER

## 2020-04-28 RX ORDER — LISINOPRIL 10 MG/1
10 TABLET ORAL DAILY
Qty: 30 TABLET | Refills: 0 | Status: SHIPPED
Start: 2020-04-28 | End: 2020-06-09 | Stop reason: SDUPTHER

## 2020-04-28 NOTE — TELEPHONE ENCOUNTER
Patient requesting a refill on lisinopril. He has a 2 week supply remaining but does not want to run out.

## 2020-04-28 NOTE — TELEPHONE ENCOUNTER
Refill will be placed but pt needs to be seen will contact him and inform him he needs an appointment for further refills

## 2020-06-09 ENCOUNTER — VIRTUAL VISIT (OUTPATIENT)
Dept: INTERNAL MEDICINE | Age: 65
End: 2020-06-09
Payer: COMMERCIAL

## 2020-06-09 PROBLEM — M25.50 MULTIPLE JOINT PAIN: Status: ACTIVE | Noted: 2017-08-07

## 2020-06-09 PROBLEM — M19.029 OSTEOARTHRITIS OF ELBOW: Status: ACTIVE | Noted: 2020-06-09

## 2020-06-09 PROBLEM — R76.8 ANA POSITIVE: Status: ACTIVE | Noted: 2017-08-07

## 2020-06-09 PROCEDURE — 99441 PR PHYS/QHP TELEPHONE EVALUATION 5-10 MIN: CPT | Performed by: INTERNAL MEDICINE

## 2020-06-09 RX ORDER — LISINOPRIL 10 MG/1
10 TABLET ORAL DAILY
Qty: 90 TABLET | Refills: 1 | Status: SHIPPED
Start: 2020-06-09 | End: 2020-12-02 | Stop reason: SDUPTHER

## 2020-06-09 ASSESSMENT — PATIENT HEALTH QUESTIONNAIRE - PHQ9
1. LITTLE INTEREST OR PLEASURE IN DOING THINGS: 0
2. FEELING DOWN, DEPRESSED OR HOPELESS: 0
SUM OF ALL RESPONSES TO PHQ9 QUESTIONS 1 & 2: 0
SUM OF ALL RESPONSES TO PHQ QUESTIONS 1-9: 0
SUM OF ALL RESPONSES TO PHQ QUESTIONS 1-9: 0

## 2020-06-09 NOTE — PATIENT INSTRUCTIONS
Thank you for coming to your follow up appointment   Please take your medications as directed and keep your follow up appointment  Call our office if you have any questions or concerns at [phone]  Have blood work done prior to next visit.       Nayeli Ortiz MD

## 2020-06-09 NOTE — PROGRESS NOTES
[address]  [facility]    Consent:  The Patient and/or health care decision maker is aware that that he or she may receive a bill for this telephone service, depending on his insurance coverage, and has provided verbal consent to proceed: Yes    Documentation:  I communicated with the patient and/or health care decision maker about     Hypertension   - BP readings 130/80s average (wife is a nurse)    IBS   - follows with gastroenterology and continues on Linzest    Cervical DDD  - s/p GALINA with pain management and reports now symptoms ok with ibuprofen prn / cervical streching    Screening: FLP, HCV  . Details of this discussion including any medical advice provided: as above    I affirm this is a Patient Initiated Episode with a Patient who has not had a related appointment within my department in the past 7 days or scheduled within the next 24 hours. Patient identification was verified at the start of the visit: Yes    Patient's location: home address in [facility]  Physician  location other address in [facility] other people involved in call, Dr. Rnona Campos. Total Time: minutes: 5-10 minutes    Mercedes Lozoya D.O.  6/9/2020 10:20 AM    Attending Physician Statement  I have discussed the case, including pertinent history and exam findings with the resident. I also have seen the patient and performed key portions of the examination. I agree with the documented assessment and plan. This visit was performed via Telemedicine during the [facility] public health crisis.
call serves to triage the patient into an appointment for the relevant concern      Robin Ratliff

## 2020-10-14 ENCOUNTER — HOSPITAL ENCOUNTER (EMERGENCY)
Age: 65
Discharge: HOME | End: 2020-10-14
Payer: COMMERCIAL

## 2020-10-14 VITALS
HEIGHT: 69 IN | HEART RATE: 63 BPM | SYSTOLIC BLOOD PRESSURE: 148 MMHG | RESPIRATION RATE: 16 BRPM | OXYGEN SATURATION: 98 % | WEIGHT: 200 LBS | BODY MASS INDEX: 29.62 KG/M2 | DIASTOLIC BLOOD PRESSURE: 74 MMHG | TEMPERATURE: 97.7 F

## 2020-10-14 PROCEDURE — 90715 TDAP VACCINE 7 YRS/> IM: CPT | Performed by: NURSE PRACTITIONER

## 2020-10-14 PROCEDURE — 99283 EMERGENCY DEPT VISIT LOW MDM: CPT

## 2020-10-14 PROCEDURE — 90471 IMMUNIZATION ADMIN: CPT | Performed by: NURSE PRACTITIONER

## 2020-10-14 PROCEDURE — 6360000002 HC RX W HCPCS: Performed by: NURSE PRACTITIONER

## 2020-10-14 PROCEDURE — 99284 EMERGENCY DEPT VISIT MOD MDM: CPT

## 2020-10-14 PROCEDURE — 6370000000 HC RX 637 (ALT 250 FOR IP): Performed by: NURSE PRACTITIONER

## 2020-10-14 RX ORDER — POLYMYXIN B SULFATE AND TRIMETHOPRIM 1; 10000 MG/ML; [USP'U]/ML
1 SOLUTION OPHTHALMIC EVERY 4 HOURS
Qty: 1 BOTTLE | Refills: 0 | Status: SHIPPED | OUTPATIENT
Start: 2020-10-14 | End: 2020-10-24

## 2020-10-14 RX ORDER — TETRACAINE HYDROCHLORIDE 5 MG/ML
2 SOLUTION OPHTHALMIC ONCE
Status: COMPLETED | OUTPATIENT
Start: 2020-10-14 | End: 2020-10-14

## 2020-10-14 RX ADMIN — TETRACAINE HYDROCHLORIDE 2 DROP: 5 SOLUTION OPHTHALMIC at 18:27

## 2020-10-14 RX ADMIN — TETANUS TOXOID, REDUCED DIPHTHERIA TOXOID AND ACELLULAR PERTUSSIS VACCINE, ADSORBED 0.5 ML: 5; 2.5; 8; 8; 2.5 SUSPENSION INTRAMUSCULAR at 18:25

## 2020-10-14 RX ADMIN — FLUORESCEIN SODIUM 1 EACH: 0.6 STRIP OPHTHALMIC at 18:27

## 2020-10-14 ASSESSMENT — PAIN DESCRIPTION - FREQUENCY: FREQUENCY: CONTINUOUS

## 2020-10-14 ASSESSMENT — PAIN SCALES - GENERAL: PAINLEVEL_OUTOF10: 2

## 2020-10-14 ASSESSMENT — PAIN DESCRIPTION - PAIN TYPE: TYPE: ACUTE PAIN

## 2020-10-14 ASSESSMENT — PAIN - FUNCTIONAL ASSESSMENT: PAIN_FUNCTIONAL_ASSESSMENT: ACTIVITIES ARE NOT PREVENTED

## 2020-10-14 ASSESSMENT — PAIN DESCRIPTION - LOCATION: LOCATION: EYE

## 2020-10-14 ASSESSMENT — PAIN DESCRIPTION - ORIENTATION: ORIENTATION: RIGHT

## 2020-10-14 ASSESSMENT — PAIN DESCRIPTION - DESCRIPTORS: DESCRIPTORS: OTHER (COMMENT)

## 2020-10-14 NOTE — LETTER
[facility] Internal Medicine   [address] [facility], [address]      October 15, 2020    Bijal Gibson  [facility] [address] [place] [address]      Dear Josh Sneed,    This letter is regarding your Emergency Department (ED) visit at [facility] Emergency Department on 10/14/20. Nabor Helm wanted to make sure that you understand your discharge instructions and that you were able to fill any prescriptions that may have been ordered for you. Please contact the office at \"[phone]  if the ED advised to you follow up with Nabor Helm, or if you have any further questions or needs. Also did you know -   *Visiting the ED for a non-emergency could result in higher co-pays than you would normally be subject to paying? *You can call your doctor even after hours so they can direct you to the most appropriate care. [facility]) practices can often offer you an appointment on the same day that you call. Many [address]  appointments; check our website for availability in your community , [email or web address]. [email or web address]    Evisits are now available for patients for $36 through [employer] for certain conditions:  * Sinus, cold and or cough       * Diarrhea            * Headache  * Heartburn                                * Poison Yaa          * Back pain     * Urinary problems                         Sincerely,     Cipriano Baptiste MD and your [facility]

## 2020-10-14 NOTE — ED PROVIDER NOTES
[facility]       Department of Emergency Medicine   ED  Provider Note  Admit Date/RoomTime: 10/14/2020  5:48 PM  ED Room: 29/29    Chief Complaint:   Eye Problem (Pt accidentally hit right eye with his thumb, redness to eye, pt reports vision is fine but eye feels sctratched)    History of Present Illness   Source of history provided by:  patient and spouse/SO. History/Exam Limitations: none. Av Lerner is a 60 y.o. old male presenting to the emergency department by private vehicle, with gradual onset pain to right eye, which began 1 hour(s) prior to arrival.  Since onset his symptoms have been persistent and moderate in severity. Associated signs & symptoms of:  none. The patients tetanus status is more than 5 years ago. He states that he was pulling hard on a strap and it broke and this is when he poked himself in the right eye with his own thumb. Mechanism:     []  FB Exposure     []  Chemical Exposure     [x]  Direct Trauma     []  High Speed Machinery Use     []  Welding      Circumstances:    []  Contact Lens Use     []  Recent URI Sx's     []  Spontaneous Onset     []  Close Contact w/similar Sx's     []  Work Related     History of:     []   Glaucoma     []   Recent Eye Surgery     ROS    Pertinent positives and negatives are stated within HPI, all other systems reviewed and are negative. Past Surgical History:  has a past surgical history that includes Ulnar tunnel release; Carpal tunnel release; lumbar fusion; Tonsillectomy and adenoidectomy; hernia repair; back surgery; Nerve Block (Right, 02/10/2020); and Pain management procedure (Right, 2/10/2020). Social History:  reports that he quit smoking about 12 years ago. His smoking use included cigarettes. He has a 30.00 pack-year smoking history. He has never used smokeless tobacco. He reports current alcohol use. He reports that he does not use drugs.   Family History: family history includes Cancer in his mother; Diabetes in his father; Hypertension in his mother. Allergies: Erythromycin    Physical Exam           ED Triage Vitals [10/14/20 1751]   BP Temp Temp Source Pulse Resp SpO2 Height Weight   (!) 148/74 97.7 °F (36.5 °C) Oral 63 16 98 % 5' 9\" (1.753 m) 200 lb (90.7 kg)      Oxygen Saturation Interpretation: Normal.    Constitutional:  Alert, development consistent with age. HENT:  NC/NT. Airway patent. Neck:  Normal ROM. Supple. Eyes:         Pupils: equal, round, reactive to light and accommodation. Eyelids: Bilateral upper and lower Swelling/redness:  None. Conjunctiva: Bilateral no erythema. Sclera: Right 3 mm abrasion with uptake of fluorescein. Negative Lizy sign. Cornea: Right no abnormalities were observed, no abrasion or foreign bodies were noted and no corneal ulcer was observed. No hyphema. EOM:  Intact Bilaterally. Fundoscopic:  not well visualized. Visual Acuity:  Within Normal Limit. Integument:  No rashes, erythema present, unless noted elsewhere. Lymphatics: No lymphangitis or adenopathy noted. Neurological:  Oriented. Motor functions intact. Lab / Imaging Results   (All laboratory and radiology results have been personally reviewed by myself)  Labs:  No results found for this visit on 10/14/20. Imaging: All Radiology results interpreted by Radiologist unless otherwise noted. No orders to display       ED Course / Medical Decision Making     Medications   tetracaine (TETRAVISC) 0.5 % ophthalmic solution 2 drop (2 drops Ophthalmic Given 10/14/20 1827)   fluorescein ophthalmic strip 1 each (1 each Left Eye Given 10/14/20 1827)   Tetanus-Diphth-Acell Pertussis (BOOSTRIX) injection 0.5 mL (0.5 mLs Intramuscular Given 10/14/20 1825)        Re-examination:  10/14/20       Time: 1830   Symptoms improved with tetracaine.     Consult(s):   None    Procedure(s):   none    MDM:   Patient presented after he excellently poked himself in the right eye with his own thumb

## 2020-12-02 RX ORDER — LISINOPRIL 10 MG/1
10 TABLET ORAL DAILY
Qty: 14 TABLET | Refills: 0 | Status: SHIPPED
Start: 2020-12-02 | End: 2020-12-16 | Stop reason: SDUPTHER

## 2020-12-16 ENCOUNTER — VIRTUAL VISIT (OUTPATIENT)
Dept: INTERNAL MEDICINE | Age: 65
End: 2020-12-16
Payer: COMMERCIAL

## 2020-12-16 PROCEDURE — 99442 PR PHYS/QHP TELEPHONE EVALUATION 11-20 MIN: CPT | Performed by: INTERNAL MEDICINE

## 2020-12-16 RX ORDER — LISINOPRIL 10 MG/1
10 TABLET ORAL DAILY
Qty: 90 TABLET | Refills: 1 | Status: SHIPPED | OUTPATIENT
Start: 2020-12-16 | End: 2021-07-07 | Stop reason: SDUPTHER

## 2020-12-16 NOTE — PROGRESS NOTES
[facility]    Attending Physician Statement:  Neil Javier M.D., F.A.C.P. I have discussed the case, including pertinent history and exam findings with the resident. I agree with the assessment, plan and orders as documented by the resident. Patient fu visit today. - telephone visit 20min  Seen in ER eye injurty better from 10/14  Last office notes reviewed, relative labs and imaging. Health maintenance issues of vaccinations, depression screening, tobacco cessation etc... covered  Chronic pain issues  Last interventions in spring-painmanagegment in past Neck and arm pain for SINAN C7-T1--claims didn't work,     Per NS--neck and right arm pain. He is neurologically stable. His MRI shows severe foraminal stenosis from C5-T1. epidurals and to PT. If he fails this, I will recommend a posterior C5-T1 laminectomy and fusion. NS offered -- he declined-- he is managing with prn aleve on bad days  IBS- linzess- -GI dr Arm[address]-- still has some diarrhea- but imroved    Tobacco former  >15 years-- no CT warrented  colonsocpy he claims 2 years ago - [facility].--needs to sign release  Remainder of medical problems as per resident note.
ASSESSMENT/PLAN:    HTN   -Checks BP 2x/day, BP ave 130s/ 70-80s  - on lisinopril 10 mg daily  - bp [id number]      IBS, stable   - on linzess  - follows Dr. Jailene Becerra last seen on 06/2020    Cervical Degenerative Disc disease  -  takes aleve  -  PT did not work   - follows Dr. Li Overall, advised PT or surgery but patient refused  -  had shots with pain management but  did not improve on it per patient    Hx of smoking  - quit > 15 years go        HCM:  + flu vaccine   Colonoscopy 2 years ago , [facility], normal findings per patient      Hans Luz is a 60 y.o. male being evaluated by a Virtual Visit (video visit) encounter to address concerns as mentioned above. A caregiver was present when appropriate. Due to this being a TeleHealth encounter (During KTU-91 public health emergency), evaluation of the following organ systems was limited: Vitals/Constitutional/EENT/Resp/CV/GI//MS/Neuro/Skin/Heme-Lymph-Imm. Pursuant to the emergency declaration under the [address] [place] [address], [address] [place] [address] authority and the [employer] and [employer] Act, this Virtual Visit was conducted with patient's (and/or legal guardian's) consent, to reduce the patient's risk of exposure to COVID-19 and provide necessary medical care. The patient (and/or legal guardian) has also been advised to contact this office for worsening conditions or problems, and seek emergency medical treatment and/or call 911 if deemed necessary. Patient identification was verified at the start of the visit: Yes    Total time spent on this encounter: 20 minutes    Services were provided through a video synchronous discussion virtually to substitute for in-person clinic visit. Patient and provider were located at their individual homes. --Souleymane Costa MD on 12/16/2020 at 3:32 PM    An electronic signature was used to authenticate this note.

## 2021-02-10 ENCOUNTER — OFFICE VISIT (OUTPATIENT)
Dept: PAIN MANAGEMENT | Age: 66
End: 2021-02-10
Payer: COMMERCIAL

## 2021-02-10 VITALS
DIASTOLIC BLOOD PRESSURE: 86 MMHG | SYSTOLIC BLOOD PRESSURE: 158 MMHG | RESPIRATION RATE: 16 BRPM | TEMPERATURE: 97.3 F | HEART RATE: 82 BPM

## 2021-02-10 DIAGNOSIS — M25.562 CHRONIC PAIN OF LEFT KNEE: ICD-10-CM

## 2021-02-10 DIAGNOSIS — G89.29 CHRONIC PAIN OF LEFT KNEE: ICD-10-CM

## 2021-02-10 DIAGNOSIS — R10.32 INGUINAL PAIN, LEFT: ICD-10-CM

## 2021-02-10 DIAGNOSIS — M48.02 FORAMINAL STENOSIS OF CERVICAL REGION: ICD-10-CM

## 2021-02-10 DIAGNOSIS — G89.4 CHRONIC PAIN SYNDROME: ICD-10-CM

## 2021-02-10 DIAGNOSIS — M54.12 CERVICAL RADICULOPATHY: ICD-10-CM

## 2021-02-10 DIAGNOSIS — M50.90 CERVICAL DISC DISORDER: ICD-10-CM

## 2021-02-10 DIAGNOSIS — M47.812 CERVICAL SPONDYLOSIS: ICD-10-CM

## 2021-02-10 DIAGNOSIS — M47.812 CERVICAL FACET JOINT SYNDROME: Primary | ICD-10-CM

## 2021-02-10 PROCEDURE — 99214 OFFICE O/P EST MOD 30 MIN: CPT | Performed by: PAIN MEDICINE

## 2021-02-10 PROCEDURE — 99213 OFFICE O/P EST LOW 20 MIN: CPT

## 2021-02-10 PROCEDURE — 6360000002 HC RX W HCPCS

## 2021-02-10 PROCEDURE — 20610 DRAIN/INJ JOINT/BURSA W/O US: CPT | Performed by: PAIN MEDICINE

## 2021-02-10 RX ORDER — BUPIVACAINE HYDROCHLORIDE 2.5 MG/ML
5 INJECTION, SOLUTION INFILTRATION; PERINEURAL ONCE
Status: COMPLETED | OUTPATIENT
Start: 2021-02-10 | End: 2021-02-10

## 2021-02-10 RX ORDER — METHYLPREDNISOLONE ACETATE 40 MG/ML
40 INJECTION, SUSPENSION INTRA-ARTICULAR; INTRALESIONAL; INTRAMUSCULAR; SOFT TISSUE ONCE
Status: COMPLETED | OUTPATIENT
Start: 2021-02-10 | End: 2021-02-10

## 2021-02-10 RX ORDER — PREDNISONE 20 MG/1
TABLET ORAL
COMMUNITY
Start: 2021-02-04 | End: 2021-02-17

## 2021-02-10 RX ADMIN — METHYLPREDNISOLONE ACETATE 40 MG: 40 INJECTION, SUSPENSION INTRA-ARTICULAR; INTRALESIONAL; INTRAMUSCULAR; SOFT TISSUE at 15:55

## 2021-02-10 RX ADMIN — BUPIVACAINE HYDROCHLORIDE 25 MG: 2.5 INJECTION, SOLUTION INFILTRATION; PERINEURAL at 15:54

## 2021-02-10 NOTE — PROGRESS NOTES
[address]  [address] [facility], [address] [place] [address] [place]  [phone]    Follow up Note      Tamra Dye     Date of Visit:  2/10/2021    CC:  Patient presents for follow up   Chief Complaint   Patient presents with    Follow-up    Leg Pain     from left hip to left knee     HPI:    Follow up on his neck pain which is tolerable, complaints of increased Left groin and Left knee pain  Appropriate analgesia with current medications regimen: N/A  Change in quality of symptoms:no. Medication side effects:none. Recent diagnostic testing:none. Recent interventional procedures:none    Imaging:   MRI cervical spine 11/2019  1. Mild central canal stenoses at C5-6 and C6-7.   2. Multilevel neural foraminal stenoses, worst (severe) at the right   C7-T1 level. The signal abnormality seen in the right neural foramen   at this level may represent disc fragment or an osteophyte. Further   evaluation with CT of the cervical spine would be of value. 3. Moderate to severe neural foraminal stenoses at the bilateral C6-7   and C7-T1 level.      EMG 11/2019  1.  A right median neuropathy at/or distal to the wrist--- of moderate severity.  These findings were consistent with carpal tunnel syndrome.     2.  A right C8 motor radiculopathy or intracanalicular lesion.  This was proven with the abnormal needle testing of the paraspinals.     Previous treatments: Surgery right ulnar/carpal tunnel and medications. .    Lumbar spine surgery L5-S1 fusion        Potential Aberrant Drug-Related Behavior:    None     Urine Drug Screening:  None no opioids started     OARRS report:  02/2020 consistent     Past Medical History:   Diagnosis Date    IBS (irritable bowel syndrome)      Past Surgical History:   Procedure Laterality Date    BACK SURGERY      CARPAL TUNNEL RELEASE      HERNIA REPAIR      LUMBAR FUSION      L5-S1    NERVE BLOCK Right 02/10/2020    cervical C7-T1 paramedian    PAIN MANAGEMENT PROCEDURE Right 2/10/2020    CERVICAL EPIDURAL STEROID INJECTION C7-T1 RIGHT PARAMEDIAN performed by iMchael Gusman MD at [address]       Prior to Admission medications    Medication Sig Start Date End Date Taking?  Authorizing Provider   predniSONE (DELTASONE) 20 MG tablet TAKE 1 TABLET BY MOUTH TWICE DAILY FOR 10 DAYS 21  Yes Historical Provider, MD   lisinopril (PRINIVIL;ZESTRIL) 10 MG tablet Take 1 tablet by mouth daily 20  Yes Karis Rodriguez MD   LINZESS 290 MCG CAPS capsule TK 1 C PO D 20  Yes Historical Provider, MD   Naproxen Sodium (ALEVE PO) Take by mouth   Yes Historical Provider, MD   Multiple Vitamin (MULTIVITAMINS PO) Take by mouth   Yes Historical Provider, MD     Allergies   Allergen Reactions    Erythromycin Nausea Only     Social History     Socioeconomic History    Marital status:      Spouse name: Not on file    Number of children: Not on file    Years of education: Not on file    Highest education level: Not on file   Occupational History    Not on file   Social Needs    Financial resource strain: Not on file    Food insecurity     Worry: Not on file     Inability: Not on file    Transportation needs     Medical: Not on file     Non-medical: Not on file   Tobacco Use    Smoking status: Former Smoker     Packs/day: 1.00     Years: 30.00     Pack years: 30.00     Types: Cigarettes     Quit date:      Years since quittin.1    Smokeless tobacco: Never Used   Substance and Sexual Activity    Alcohol use: Yes     Comment: socially    Drug use: Never    Sexual activity: Yes     Partners: Female   Lifestyle    Physical activity     Days per week: Not on file     Minutes per session: Not on file    Stress: Not on file   Relationships    Social connections     Talks on phone: Not on file     Gets together: Not on file     Attends Jewish service: Not on file     Active member of club or organization: Not on file     Attends meetings of clubs or organizations: Not on file     Relationship status: Not on file    Intimate partner violence     Fear of current or ex partner: Not on file     Emotionally abused: Not on file     Physically abused: Not on file     Forced sexual activity: Not on file   Other Topics Concern    Not on file   Social History Narrative    Not on file       Family History   Problem Relation Age of Onset    Cancer Mother     Hypertension Mother     Diabetes Father      REVIEW OF SYSTEMS:     Jennifer Al denies fever/chills, chest pain, shortness of breath, new bowel or bladder complaints. All other review of systems was negative. PHYSICAL EXAMINATION:      BP (!) 158/86   Pulse 82   Temp 97.3 °F (36.3 °C) (Skin)   Resp 16     General:       General appearance:   pleasant and well-hydrated. , in moderate discomfort and A & O x3  Build:Normal Weight     HEENT:     Head:normocephalic and atraumatic  Pupils:regular, round and equal.  Sclera: icterus absent,      Lungs:     Breathing:Breathing Pattern: regular, no distress     Abdomen:     Shape:non-distended and normal  Tenderness:  Tenderness over Left external inguinal ring. No swelling or mass on the Left external or internal inguinal ring/not reproducible with cough while standing     Cervical spine:     Inspection:normal  Palpation:tenderness paravertebral muscles, facet loading, right, left positive and tenderness. Range of motion:abnormal mildly flexion, extension rotation right and is  painful. Lumbar spine:    Spine inspection:surgical incision scar   CVA tenderness:No   Palpation:tenderness paravertebral muscles, facet loading, left, right and negative.   Range of motion:abnormal mildly Lateral bending, flexion, extension rotation bilateral and is not painful.     Musculoskeletal:     Trigger points in Paraveteral:absent bilaterally  Trevizo's:negative right, negative left    Extremities:     Tremors:None bilaterally upper and lower  Range of motion:Generally normal shoulders  Intact:Yes  Edema:Normal    Knee: Inspection:symmetric, swelling none bilaterally  Tenderness of Bony Landmarks:Medial, left  Drawer Test:negative  Effusion:absent bilaterally  Crepitus:absent bilaterally  ROM:Left limited by pain     Neurological:     Sensory:normal to light touch bilateral upper extremities              Motor:              Right Bicep5/5           Left Bicep5/5              Right Triceps5/5       Left Triceps5/5          Right Deltoid5/5     Left Deltoid5/5     Right Quadriceps5/5          Left Quadriceps5/5           Right Gastrocnemius5/5    Left Gastrocnemius5/5  Right Ant Tibialis5/5  Left Ant Tibialis5/5            Reflexes:    Right Brachioradialis reflex2+  Left Brachioradialis reflex2+  Right Biceps reflex2+  Left Biceps reflex2+  Right Triceps reflex2+  Right Quadriceps reflex2+  Left Quadriceps reflex2+  Right Achilles reflex2+  Left Achilles reflex2+  Left Triceps reflex2+  Gait:normal     Dermatology:     Skin:no unusual rashes and no skin lesions     Impression:  Neck pain with radiation to the right upper extremity               Cervical spine MRI Multilevel  neural foraminal stenoses, worst (severe) at the right C7-T1 level. Patient had seen Damion Garcia who recommended SINAN before considering surgery C5-T1 laminectomy and fusion  Plan:  Follow up on his neck pain which is manageable with complaints of increased Left groin pain and Left knee pain with h/o Left inguinal hernia repair. On exam no palpable mass over Left external inguinal ring, will refer to [address] [place] [address] for second opinion. Will schedule patient for Left knee injection. Continue with OTC pain medications   OARRS report reviewed 02/2021. Patient encouraged to stay active. Treatment plan discussed with the patient including medications and procedure side effects.     We discussed with the patient that combining opioids, benzodiazepines, alcohol, illicit drugs or sleep aids increases the risk of respiratory depression including death. We discussed that these medications may cause drowsiness, sedation or dizziness and have counseled the patient not to drive or operate machinery. We have discussed that these medications will be prescribed only by one provider. We have discussed with the patient about age related risk factors and have thoroughly discussed the importance of taking these medications as prescribed. The patient verbalizes understanding. ccreferring physic    ANDRE Ward M.D.    2/10/2021    Patient: Valery Ibarra  :  1955  Age:  60 y.o. Sex:  male     PRE-OPERATIVE DIAGNOSIS: Left  Knee pain, osteoarthitis. POST-OPERATIVE DIAGNOSIS: Same. PROCEDURE PERFORMED:  Left knee injection. SURGEON:   ANDRE Ward M.D. ANESTHESIA: Local    ESTIMATED BLOOD LOSS: None. BRIEF HISTORY: Valery Ibarra comes in today for Left  knee injection. . After discussing the potential risks and benefits of the procedure with the patient. Vance Thorpe did request that we proceed. A complete History & Physical was reviewed and it is unchanged. DESCRIPTION OF PROCEDURE:   The patient was placed in a seated position. The area of the Left knee was prepped with chloraprep and draped in a sterile manner. The overlying skin and subcutaneous tissues were anesthetized with 0.5% Lidocaine. Then the targeted area of the patellar tendon was palpated and marked. A 25 gauge 1 1/2 inch needle was advanced into the joint capsule. Confirmation of needle placement was obtained by direct visualization of synovial fluid by aspiration. A solution of 0.25 % marcaine 5 cc and 40 mg DepoMedrol was injected easily without complications. The needle was then removed and Band-Aid applied. Disposition the patient tolerated the procedure well and there were no complications .      Vance Thorpe will follow up in our comprehensive [facility] as scheduled. He was encouraged to call with questions, concerns or if worsening of symptoms occurs.     Edita Bass MD

## 2021-02-10 NOTE — PROGRESS NOTES
Do you currently have any of the following:    Fever: No  Headache:  No  Cough: No  Shortness of breath: No  Exposed to anyone with these symptoms: No                                                                                                                Greer Díaz presents to the [address] [address] on 2/10/2021. Jennifer Al is complaining of pain in his from left hip to left knee. The pain is persistent. The pain is described as aching, sharp and burning. Pain is rated on his best day at a 7, on his worst day at a 10, and on average at a 8 on the VAS scale. He took his last dose of Aleve today. Jennifer Al does not have issues with constipation. Any procedures since your last visit: No.    He is NSAIDS and is not on anticoagulation medications. Pacemaker or defibrillator: No Physician managing device is N/A.    BP (!) 158/86   Pulse 82   Temp 97.3 °F (36.3 °C) (Skin)   Resp 16      No LMP for male patient.

## 2021-02-17 ENCOUNTER — OFFICE VISIT (OUTPATIENT)
Dept: SURGERY | Age: 66
End: 2021-02-17
Payer: COMMERCIAL

## 2021-02-17 ENCOUNTER — TELEPHONE (OUTPATIENT)
Dept: SURGERY | Age: 66
End: 2021-02-17

## 2021-02-17 VITALS
RESPIRATION RATE: 18 BRPM | SYSTOLIC BLOOD PRESSURE: 178 MMHG | WEIGHT: 200 LBS | BODY MASS INDEX: 29.62 KG/M2 | DIASTOLIC BLOOD PRESSURE: 91 MMHG | HEIGHT: 69 IN | TEMPERATURE: 98.2 F | HEART RATE: 84 BPM

## 2021-02-17 DIAGNOSIS — K40.91 RECURRENT LEFT INGUINAL HERNIA: Primary | ICD-10-CM

## 2021-02-17 PROCEDURE — 99204 OFFICE O/P NEW MOD 45 MIN: CPT | Performed by: SURGERY

## 2021-02-17 RX ORDER — SODIUM CHLORIDE 0.9 % (FLUSH) 0.9 %
10 SYRINGE (ML) INJECTION PRN
Status: CANCELLED | OUTPATIENT
Start: 2021-02-17

## 2021-02-17 RX ORDER — SODIUM CHLORIDE 0.9 % (FLUSH) 0.9 %
10 SYRINGE (ML) INJECTION EVERY 12 HOURS SCHEDULED
Status: CANCELLED | OUTPATIENT
Start: 2021-02-17

## 2021-02-17 RX ORDER — SODIUM CHLORIDE 9 MG/ML
INJECTION, SOLUTION INTRAVENOUS CONTINUOUS
Status: CANCELLED | OUTPATIENT
Start: 2021-02-17

## 2021-02-17 NOTE — PROGRESS NOTES
General Surgery History and Physical  [facility]    Patient's Name/Date of Birth: Jace Randle / 1955    Date: February 17, 2021     Surgeon: Dusty Conley M.D.    PCP: Lisa Medina MD     Chief Complaint: left Inguinal bulge, recurrent    HPI:   Jace Randle is a 60 y.o. male who presents for evaluation of left inguinal hernia, recurrent. Timing is constant, radiation to left groin, alleviated by none and started few weeks ago and severity is 8/10. States pain has been worsening, no symptoms of bowel obstruction, nausea, vomiting, fever, chills, abdominal pain. States it protrudes with activity, it is reducible. Patient with a history of an open inguinal hernia done more than 15 years ago in [place]. States he thinks he had mesh but is not sure. States increasing his activity last few weeks he has felt an increase in size of the bulge and pain has dramatically worsened. He was seen by pain management Dr. Dalia Isaac who diagnosed him with an inguinal hernia and referred him to us. Denies any change in bowel habits nausea vomiting shortness of breath or chest pain. States that he is got chronic irritable bowel disease and has been on Linzess and he usually has regular bowel movements daily. States he has increasing pain with bowel movements.     Patient Active Problem List   Diagnosis    Irritable bowel syndrome    Chronic pain syndrome    Cervical disc disorder    Cervical facet joint syndrome    Cervical radiculopathy    Cervical spondylosis    Foraminal stenosis of cervical region    MONTRELL positive    Multiple joint pain    Osteoarthritis of elbow       Past Medical History:   Diagnosis Date    IBS (irritable bowel syndrome)        Past Surgical History:   Procedure Laterality Date    BACK SURGERY      BUNIONECTOMY      CARPAL TUNNEL RELEASE Bilateral     x2    COLONOSCOPY  2018    [place] HERNIA REPAIR  2003    Inguinal hernia    LUMBAR FUSION L5-S1    NERVE BLOCK Right 02/10/2020    cervical C7-T1 paramedian    PAIN MANAGEMENT PROCEDURE Right 02/10/2020    CERVICAL EPIDURAL STEROID INJECTION C7-T1 RIGHT PARAMEDIAN performed by Cornel Vásquez MD at [address] ENDOSCOPY  2018    [facility]       Allergies   Allergen Reactions    Erythromycin Nausea Only       The patient has a family history that is negative for severe cardiovascular or respiratory issues, negative for reaction to anesthesia. Time spent reviewing past medical, surgical, social and family history, vitals, nursing assessment and images. No changes from above documented history.     Social History     Socioeconomic History    Marital status:      Spouse name: Not on file    Number of children: Not on file    Years of education: Not on file    Highest education level: Not on file   Occupational History    Not on file   Social Needs    Financial resource strain: Not on file    Food insecurity     Worry: Not on file     Inability: Not on file    Transportation needs     Medical: Not on file     Non-medical: Not on file   Tobacco Use    Smoking status: Former Smoker     Packs/day: 1.00     Years: 30.00     Pack years: 30.00     Types: Cigarettes     Quit date:      Years since quittin.1    Smokeless tobacco: Never Used   Substance and Sexual Activity    Alcohol use: Yes     Comment: socially    Drug use: Never    Sexual activity: Yes     Partners: Female   Lifestyle    Physical activity     Days per week: Not on file     Minutes per session: Not on file    Stress: Not on file   Relationships    Social connections     Talks on phone: Not on file     Gets together: Not on file     Attends Buddhist service: Not on file     Active member of club or organization: Not on file     Attends meetings of clubs or organizations: Not on file     Relationship status: Not on admission and that available in the EMR      Assessment:  Genesis Bowman is a 60 y.o. male with left groin pain reducible recurrent left inguinal hernia  Patient Active Problem List   Diagnosis    Irritable bowel syndrome    Chronic pain syndrome    Cervical disc disorder    Cervical facet joint syndrome    Cervical radiculopathy    Cervical spondylosis    Foraminal stenosis of cervical region    MONTRELL positive    Multiple joint pain    Osteoarthritis of elbow         Plan:  OR for laparoscopic robot assisted left inguinal hernia repair with mesh possible bilateral  Discussed the risk, benefits and alternatives of surgery including wound infections, bleeding, scar, recurrent hernia formation, mesh infection and migration, chronic groin pain, nerve injury, testicle injury, repeat procedures and the risks of general anesthetic including MI, CVA, sudden death or reactions to anesthetic medications. The patient understands the risks and alternatives and the possibility of converting to an open procedure. All questions were answered to the patient's satisfaction and they freely signed the consent.            Zachary Smith MD  12:09 PM  2/17/2021

## 2021-02-17 NOTE — TELEPHONE ENCOUNTER
Per Dr. Hema Ty, patient is scheduled for Laparoscopic robotic bilateral inguinal hernia repair with mesh at [address] [place] [address] on 3/12/21. Surgery scheduling form faxed with confirmation, surgeon's calendar updated. Dr. Hema Ty to enter orders. Follow up appointment scheduled.   Electronically signed by Flako Montoya RN on 2/17/2021 at 12:13 PM

## 2021-02-17 NOTE — TELEPHONE ENCOUNTER
Prior Authorization Form:      DEMOGRAPHICS:                     Patient Name:  Dalton Bazzi  Patient :  1955            Insurance:  Payor: [employer] / Plan: [employer] [address] EMP / Product Type: *No Product type* /   Insurance ID Number:    Payor/Plan Subscr  Sex Relation Sub. Ins. ID Effective Group Num   1. MEDICAL MUTUAKarole Vietnamese 1965 Female Spouse [id number]                                   [address]   2.  Kenith Climes* Sia Dad 1955 Male Self [id number]                                   [address]         DIAGNOSIS & PROCEDURE:                       Procedure/Operation: Laparoscopic robotic bilateral inguinal hernia repair with mesh           CPT Code: 40607    Diagnosis:  Bilateral inguinal hernia    ICD10 Code: K40.20    Location:  [address] [place] [address]    Surgeon:  Sudarshan Fletcher INFORMATION:                          Date: 3/12/21    Time: TBD              Anesthesia:  General                                                       Status:  Outpatient        Special Comments:         Electronically signed by Tiffanie Asher RN on 2021 at 12:13 PM

## 2021-02-25 ENCOUNTER — TELEPHONE (OUTPATIENT)
Dept: PAIN MANAGEMENT | Age: 66
End: 2021-02-25

## 2021-02-25 DIAGNOSIS — R10.32 INGUINAL PAIN, LEFT: ICD-10-CM

## 2021-02-25 DIAGNOSIS — G89.4 CHRONIC PAIN SYNDROME: Primary | ICD-10-CM

## 2021-02-25 RX ORDER — TRAMADOL HYDROCHLORIDE 50 MG/1
50 TABLET ORAL 2 TIMES DAILY PRN
Qty: 15 TABLET | Refills: 0 | Status: SHIPPED
Start: 2021-02-25 | End: 2021-03-08 | Stop reason: ALTCHOICE

## 2021-02-25 NOTE — TELEPHONE ENCOUNTER
After collaborating with Dr Chandrakant Burnett I will send in Tramadol 50mg to take 0.5mg by mouth twice a day x 15 days to get him to his surgery date.

## 2021-02-25 NOTE — TELEPHONE ENCOUNTER
Patient daughter called asking if we could give her dad something for pain. He did see Dr Alda Cabrera and has surgery scheduled but is in a lot of pain.

## 2021-03-08 ENCOUNTER — HOSPITAL ENCOUNTER (OUTPATIENT)
Age: 66
Discharge: HOME | End: 2021-03-10
Payer: COMMERCIAL

## 2021-03-08 ENCOUNTER — HOSPITAL ENCOUNTER (OUTPATIENT)
Dept: PREADMISSION TESTING | Age: 66
Discharge: HOME | End: 2021-03-08
Payer: COMMERCIAL

## 2021-03-08 VITALS
DIASTOLIC BLOOD PRESSURE: 88 MMHG | WEIGHT: 205 LBS | BODY MASS INDEX: 30.36 KG/M2 | RESPIRATION RATE: 18 BRPM | HEIGHT: 69 IN | HEART RATE: 81 BPM | TEMPERATURE: 98.1 F | OXYGEN SATURATION: 98 % | SYSTOLIC BLOOD PRESSURE: 155 MMHG

## 2021-03-08 DIAGNOSIS — Z01.818 PREOP TESTING: ICD-10-CM

## 2021-03-08 DIAGNOSIS — Z01.818 PRE-OP TESTING: Primary | ICD-10-CM

## 2021-03-08 LAB
ALBUMIN SERPL-MCNC: 4.6 G/DL (ref 3.5–5.2)
ALP BLD-CCNC: 67 U/L (ref 40–129)
ALT SERPL-CCNC: 24 U/L (ref 0–40)
ANION GAP SERPL CALCULATED.3IONS-SCNC: 9 MMOL/L (ref 7–16)
AST SERPL-CCNC: 21 U/L (ref 0–39)
BILIRUB SERPL-MCNC: 0.3 MG/DL (ref 0–1.2)
BUN BLDV-MCNC: 13 MG/DL (ref 8–23)
CALCIUM SERPL-MCNC: 8.8 MG/DL (ref 8.6–10.2)
CHLORIDE BLD-SCNC: 105 MMOL/L (ref 98–107)
CO2: 24 MMOL/L (ref 22–29)
CREAT SERPL-MCNC: 0.8 MG/DL (ref 0.7–1.2)
GFR AFRICAN AMERICAN: >60
GFR NON-AFRICAN AMERICAN: >60 ML/MIN/1.73
GLUCOSE BLD-MCNC: 95 MG/DL (ref 74–99)
HCT VFR BLD CALC: 38.9 % (ref 37–54)
HEMOGLOBIN: 13.4 G/DL (ref 12.5–16.5)
MCH RBC QN AUTO: 30.6 PG (ref 26–35)
MCHC RBC AUTO-ENTMCNC: 34.4 % (ref 32–34.5)
MCV RBC AUTO: 88.8 FL (ref 80–99.9)
PDW BLD-RTO: 13 FL (ref 11.5–15)
PLATELET # BLD: 247 E9/L (ref 130–450)
PMV BLD AUTO: 9.4 FL (ref 7–12)
POTASSIUM REFLEX MAGNESIUM: 4.3 MMOL/L (ref 3.5–5)
RBC # BLD: 4.38 E12/L (ref 3.8–5.8)
SODIUM BLD-SCNC: 138 MMOL/L (ref 132–146)
TOTAL PROTEIN: 6.8 G/DL (ref 6.4–8.3)
WBC # BLD: 5.7 E9/L (ref 4.5–11.5)

## 2021-03-08 PROCEDURE — U0003 INFECTIOUS AGENT DETECTION BY NUCLEIC ACID (DNA OR RNA); SEVERE ACUTE RESPIRATORY SYNDROME CORONAVIRUS 2 (SARS-COV-2) (CORONAVIRUS DISEASE [COVID-19]), AMPLIFIED PROBE TECHNIQUE, MAKING USE OF HIGH THROUGHPUT TECHNOLOGIES AS DESCRIBED BY CMS-2020-01-R: HCPCS

## 2021-03-08 PROCEDURE — 93005 ELECTROCARDIOGRAM TRACING: CPT | Performed by: ANESTHESIOLOGY

## 2021-03-08 PROCEDURE — 80053 COMPREHEN METABOLIC PANEL: CPT

## 2021-03-08 PROCEDURE — 85027 COMPLETE CBC AUTOMATED: CPT

## 2021-03-08 PROCEDURE — 36415 COLL VENOUS BLD VENIPUNCTURE: CPT

## 2021-03-08 NOTE — PROGRESS NOTES
[address]                                                                                                                     PRE OP INSTRUCTIONS FOR  [facility]        Date: 3/8/2021    Date and time of surgery: 3/12/21   Arrival Time: [facility] will call with time between 4:30-5. 1. Do not eat or drink anything after 12 midnight prior to surgery. This includes no water, chewing gum, mints or ice chips. 2. Take the following pills with a small sip of water on the morning of Surgery: None      3. Diabetics may take evening dose of insulin but none after midnight. If you feel symptomatic or low blood sugar take 1-2 ounces of apple juice only. 4. Aspirin, Ibuprofen, Advil, Naproxen, Vitamin E and other Anti-inflammatory products should be stopped  before surgery  as directed by your physician. 5. Check with your Doctor regarding stopping Plavix, Coumadin, Lovenox, Fragmin or other blood thinners. 6. Do not smoke,use illicit drugs and do not drink any alcoholic beverages 24 hours prior to surgery. 7. You may brush your teeth and gargle the morning of surgery. DO NOT SWALLOW WATER    8. You MUST make arrangements for a responsible adult to take you home after your surgery. You will not be allowed to leave alone or drive yourself home. It is strongly suggested someone stay with you the first 24 hrs. Your surgery will be cancelled if you do not have a ride home. 9. A parent/legal guardian must accompany a child scheduled for surgery and plan to stay at the hospital until the child is discharged. Please do not bring other children with you. 10. Please wear simple, loose fitting clothing to the hospital.  [address] not bring valuables (money, credit cards, checkbooks, etc.) Do not wear any makeup (including no eye makeup) or nail polish on your fingers or toes. 11. DO NOT wear any jewelry or piercings on day of surgery. All body piercing jewelry must be removed. 12.  Shower the night before surgery with _X__Antibacterial soap ___Hibiclens. 15. Remember to bring Blood Bank bracelet to the hospital on the day of surgery. 14. If you have a Living Will and Durable Power of  for Healthcare, please bring in a copy. 15. If appropriate bring crutches, inspirex, etc... 12. Notify your Surgeon if you develop any illness between now and surgery time, cough, cold, fever, sore throat, nausea, vomiting, etc.  Please notify your surgeon if you experience dizziness, shortness of breath or blurred vision between now & the time of your surgery. 17. If you have ___dentures, they will be removed before going to the OR; we will provide you a container. If you wear ___contact lenses or ___glasses, they will be removed; please bring a case for them. 18. To provide excellent care visitors will be limited to one in the room at any given time. 19. Please bring picture ID and insurance card. 20. Sleep apnea patients need to bring CPAP to hospital on day of surgery. 21. Visit our web site for additional information: [email or web address]    22. During flu season no children under the age of 15 are permitted in the hospital for the safety of all patients. 23. Other Come in through main lobby, go to information desk. Please call pre admission testing if you have any further questions.    [address] [facility]     [address]

## 2021-03-09 LAB
EKG ATRIAL RATE: 67 BPM
EKG P AXIS: 16 DEGREES
EKG P-R INTERVAL: 144 MS
EKG Q-T INTERVAL: 416 MS
EKG QRS DURATION: 90 MS
EKG QTC CALCULATION (BAZETT): 439 MS
EKG R AXIS: -13 DEGREES
EKG T AXIS: -3 DEGREES
EKG VENTRICULAR RATE: 67 BPM
SARS-COV-2: NOT DETECTED
SOURCE: NORMAL

## 2021-03-12 ENCOUNTER — ANESTHESIA (OUTPATIENT)
Dept: OPERATING ROOM | Age: 66
End: 2021-03-12
Payer: COMMERCIAL

## 2021-03-12 ENCOUNTER — HOSPITAL ENCOUNTER (OUTPATIENT)
Age: 66
Setting detail: OUTPATIENT SURGERY
Discharge: HOME | End: 2021-03-12
Attending: SURGERY | Admitting: SURGERY
Payer: COMMERCIAL

## 2021-03-12 ENCOUNTER — ANESTHESIA EVENT (OUTPATIENT)
Dept: OPERATING ROOM | Age: 66
End: 2021-03-12
Payer: COMMERCIAL

## 2021-03-12 VITALS
DIASTOLIC BLOOD PRESSURE: 86 MMHG | RESPIRATION RATE: 14 BRPM | SYSTOLIC BLOOD PRESSURE: 119 MMHG | OXYGEN SATURATION: 94 % | TEMPERATURE: 97.5 F

## 2021-03-12 VITALS
RESPIRATION RATE: 20 BRPM | TEMPERATURE: 97.1 F | DIASTOLIC BLOOD PRESSURE: 78 MMHG | SYSTOLIC BLOOD PRESSURE: 160 MMHG | OXYGEN SATURATION: 95 % | HEART RATE: 76 BPM

## 2021-03-12 DIAGNOSIS — Z01.818 PREOP TESTING: Primary | ICD-10-CM

## 2021-03-12 DIAGNOSIS — G89.18 POSTOPERATIVE PAIN: ICD-10-CM

## 2021-03-12 PROCEDURE — 7100000000 HC PACU RECOVERY - FIRST 15 MIN: Performed by: SURGERY

## 2021-03-12 PROCEDURE — 3600000009 HC SURGERY ROBOT BASE: Performed by: SURGERY

## 2021-03-12 PROCEDURE — 3700000001 HC ADD 15 MINUTES (ANESTHESIA): Performed by: SURGERY

## 2021-03-12 PROCEDURE — 6360000002 HC RX W HCPCS

## 2021-03-12 PROCEDURE — 2580000003 HC RX 258: Performed by: SURGERY

## 2021-03-12 PROCEDURE — 6360000002 HC RX W HCPCS: Performed by: NURSE ANESTHETIST, CERTIFIED REGISTERED

## 2021-03-12 PROCEDURE — 7100000010 HC PHASE II RECOVERY - FIRST 15 MIN: Performed by: SURGERY

## 2021-03-12 PROCEDURE — 2500000003 HC RX 250 WO HCPCS: Performed by: SURGERY

## 2021-03-12 PROCEDURE — 2500000003 HC RX 250 WO HCPCS: Performed by: NURSE ANESTHETIST, CERTIFIED REGISTERED

## 2021-03-12 PROCEDURE — C1781 MESH (IMPLANTABLE): HCPCS | Performed by: SURGERY

## 2021-03-12 PROCEDURE — 49651 LAP ING HERNIA REPAIR RECUR: CPT | Performed by: SURGERY

## 2021-03-12 PROCEDURE — 2709999900 HC NON-CHARGEABLE SUPPLY: Performed by: SURGERY

## 2021-03-12 PROCEDURE — 6360000002 HC RX W HCPCS: Performed by: SURGERY

## 2021-03-12 PROCEDURE — 7100000011 HC PHASE II RECOVERY - ADDTL 15 MIN: Performed by: SURGERY

## 2021-03-12 PROCEDURE — S2900 ROBOTIC SURGICAL SYSTEM: HCPCS | Performed by: SURGERY

## 2021-03-12 PROCEDURE — 3700000000 HC ANESTHESIA ATTENDED CARE: Performed by: SURGERY

## 2021-03-12 PROCEDURE — 7100000001 HC PACU RECOVERY - ADDTL 15 MIN: Performed by: SURGERY

## 2021-03-12 PROCEDURE — 3600000019 HC SURGERY ROBOT ADDTL 15MIN: Performed by: SURGERY

## 2021-03-12 DEVICE — MESH HERN W16XL12CM LAP MFIL POLY TEREPHTHALATE MFIL: Type: IMPLANTABLE DEVICE | Site: INGUINAL | Status: FUNCTIONAL

## 2021-03-12 RX ORDER — KETOROLAC TROMETHAMINE 30 MG/ML
INJECTION, SOLUTION INTRAMUSCULAR; INTRAVENOUS PRN
Status: DISCONTINUED | OUTPATIENT
Start: 2021-03-12 | End: 2021-03-12 | Stop reason: SDUPTHER

## 2021-03-12 RX ORDER — DIPHENHYDRAMINE HYDROCHLORIDE 50 MG/ML
12.5 INJECTION INTRAMUSCULAR; INTRAVENOUS
Status: DISCONTINUED | OUTPATIENT
Start: 2021-03-12 | End: 2021-03-12 | Stop reason: HOSPADM

## 2021-03-12 RX ORDER — BUPIVACAINE HYDROCHLORIDE AND EPINEPHRINE 2.5; 5 MG/ML; UG/ML
INJECTION, SOLUTION EPIDURAL; INFILTRATION; INTRACAUDAL; PERINEURAL PRN
Status: DISCONTINUED | OUTPATIENT
Start: 2021-03-12 | End: 2021-03-12 | Stop reason: ALTCHOICE

## 2021-03-12 RX ORDER — ONDANSETRON 2 MG/ML
INJECTION INTRAMUSCULAR; INTRAVENOUS PRN
Status: DISCONTINUED | OUTPATIENT
Start: 2021-03-12 | End: 2021-03-12 | Stop reason: SDUPTHER

## 2021-03-12 RX ORDER — FENTANYL CITRATE 50 UG/ML
INJECTION, SOLUTION INTRAMUSCULAR; INTRAVENOUS PRN
Status: DISCONTINUED | OUTPATIENT
Start: 2021-03-12 | End: 2021-03-12 | Stop reason: SDUPTHER

## 2021-03-12 RX ORDER — NEOSTIGMINE METHYLSULFATE 1 MG/ML
INJECTION, SOLUTION INTRAVENOUS PRN
Status: DISCONTINUED | OUTPATIENT
Start: 2021-03-12 | End: 2021-03-12 | Stop reason: SDUPTHER

## 2021-03-12 RX ORDER — TRIAMCINOLONE ACETONIDE 40 MG/ML
INJECTION, SUSPENSION INTRA-ARTICULAR; INTRAMUSCULAR PRN
Status: DISCONTINUED | OUTPATIENT
Start: 2021-03-12 | End: 2021-03-12 | Stop reason: ALTCHOICE

## 2021-03-12 RX ORDER — IBUPROFEN 800 MG/1
800 TABLET ORAL EVERY 6 HOURS PRN
Qty: 90 TABLET | Refills: 1 | Status: SHIPPED | OUTPATIENT
Start: 2021-03-12 | End: 2021-03-26

## 2021-03-12 RX ORDER — SODIUM CHLORIDE 0.9 % (FLUSH) 0.9 %
10 SYRINGE (ML) INJECTION PRN
Status: DISCONTINUED | OUTPATIENT
Start: 2021-03-12 | End: 2021-03-12 | Stop reason: HOSPADM

## 2021-03-12 RX ORDER — MIDAZOLAM HYDROCHLORIDE 1 MG/ML
INJECTION INTRAMUSCULAR; INTRAVENOUS PRN
Status: DISCONTINUED | OUTPATIENT
Start: 2021-03-12 | End: 2021-03-12 | Stop reason: SDUPTHER

## 2021-03-12 RX ORDER — DOCUSATE SODIUM 100 MG/1
100 CAPSULE, LIQUID FILLED ORAL 2 TIMES DAILY
Qty: 30 CAPSULE | Refills: 0 | Status: SHIPPED | OUTPATIENT
Start: 2021-03-12 | End: 2021-03-27

## 2021-03-12 RX ORDER — PROMETHAZINE HYDROCHLORIDE 25 MG/ML
6.25 INJECTION, SOLUTION INTRAMUSCULAR; INTRAVENOUS
Status: DISCONTINUED | OUTPATIENT
Start: 2021-03-12 | End: 2021-03-12 | Stop reason: HOSPADM

## 2021-03-12 RX ORDER — SODIUM CHLORIDE 9 MG/ML
INJECTION, SOLUTION INTRAVENOUS CONTINUOUS
Status: DISCONTINUED | OUTPATIENT
Start: 2021-03-12 | End: 2021-03-12 | Stop reason: HOSPADM

## 2021-03-12 RX ORDER — LIDOCAINE HYDROCHLORIDE 20 MG/ML
INJECTION, SOLUTION INTRAVENOUS PRN
Status: DISCONTINUED | OUTPATIENT
Start: 2021-03-12 | End: 2021-03-12 | Stop reason: SDUPTHER

## 2021-03-12 RX ORDER — CEFAZOLIN SODIUM 2 G/50ML
2000 SOLUTION INTRAVENOUS
Status: COMPLETED | OUTPATIENT
Start: 2021-03-12 | End: 2021-03-12

## 2021-03-12 RX ORDER — PROPOFOL 10 MG/ML
INJECTION, EMULSION INTRAVENOUS PRN
Status: DISCONTINUED | OUTPATIENT
Start: 2021-03-12 | End: 2021-03-12 | Stop reason: SDUPTHER

## 2021-03-12 RX ORDER — ROCURONIUM BROMIDE 10 MG/ML
INJECTION, SOLUTION INTRAVENOUS PRN
Status: DISCONTINUED | OUTPATIENT
Start: 2021-03-12 | End: 2021-03-12 | Stop reason: SDUPTHER

## 2021-03-12 RX ORDER — OXYCODONE HYDROCHLORIDE AND ACETAMINOPHEN 5; 325 MG/1; MG/1
1 TABLET ORAL EVERY 6 HOURS PRN
Qty: 18 TABLET | Refills: 0 | Status: SHIPPED | OUTPATIENT
Start: 2021-03-12 | End: 2021-03-17

## 2021-03-12 RX ORDER — LABETALOL HYDROCHLORIDE 5 MG/ML
5 INJECTION, SOLUTION INTRAVENOUS EVERY 10 MIN PRN
Status: DISCONTINUED | OUTPATIENT
Start: 2021-03-12 | End: 2021-03-12 | Stop reason: HOSPADM

## 2021-03-12 RX ORDER — OXYCODONE HYDROCHLORIDE AND ACETAMINOPHEN 5; 325 MG/1; MG/1
1 TABLET ORAL EVERY 4 HOURS PRN
Status: DISCONTINUED | OUTPATIENT
Start: 2021-03-12 | End: 2021-03-12 | Stop reason: HOSPADM

## 2021-03-12 RX ORDER — SODIUM CHLORIDE, SODIUM LACTATE, POTASSIUM CHLORIDE, CALCIUM CHLORIDE 600; 310; 30; 20 MG/100ML; MG/100ML; MG/100ML; MG/100ML
INJECTION, SOLUTION INTRAVENOUS CONTINUOUS PRN
Status: DISCONTINUED | OUTPATIENT
Start: 2021-03-12 | End: 2021-03-12

## 2021-03-12 RX ORDER — PROCHLORPERAZINE EDISYLATE 5 MG/ML
5 INJECTION INTRAMUSCULAR; INTRAVENOUS
Status: DISCONTINUED | OUTPATIENT
Start: 2021-03-12 | End: 2021-03-12 | Stop reason: HOSPADM

## 2021-03-12 RX ORDER — GLYCOPYRROLATE 1 MG/5 ML
SYRINGE (ML) INTRAVENOUS PRN
Status: DISCONTINUED | OUTPATIENT
Start: 2021-03-12 | End: 2021-03-12 | Stop reason: SDUPTHER

## 2021-03-12 RX ORDER — MEPERIDINE HYDROCHLORIDE 25 MG/ML
12.5 INJECTION INTRAMUSCULAR; INTRAVENOUS; SUBCUTANEOUS EVERY 5 MIN PRN
Status: DISCONTINUED | OUTPATIENT
Start: 2021-03-12 | End: 2021-03-12 | Stop reason: HOSPADM

## 2021-03-12 RX ORDER — SODIUM CHLORIDE 0.9 % (FLUSH) 0.9 %
10 SYRINGE (ML) INJECTION EVERY 12 HOURS SCHEDULED
Status: DISCONTINUED | OUTPATIENT
Start: 2021-03-12 | End: 2021-03-12 | Stop reason: HOSPADM

## 2021-03-12 RX ADMIN — SODIUM CHLORIDE: 9 INJECTION, SOLUTION INTRAVENOUS at 17:32

## 2021-03-12 RX ADMIN — Medication 3 MG: at 17:33

## 2021-03-12 RX ADMIN — FENTANYL CITRATE 50 MCG: 50 INJECTION, SOLUTION INTRAMUSCULAR; INTRAVENOUS at 17:44

## 2021-03-12 RX ADMIN — FENTANYL CITRATE 50 MCG: 50 INJECTION, SOLUTION INTRAMUSCULAR; INTRAVENOUS at 16:38

## 2021-03-12 RX ADMIN — ROCURONIUM BROMIDE 40 MG: 10 SOLUTION INTRAVENOUS at 16:42

## 2021-03-12 RX ADMIN — CEFAZOLIN SODIUM 2000 MG: 2 SOLUTION INTRAVENOUS at 16:38

## 2021-03-12 RX ADMIN — SODIUM CHLORIDE: 9 INJECTION, SOLUTION INTRAVENOUS at 16:38

## 2021-03-12 RX ADMIN — ONDANSETRON 4 MG: 2 INJECTION INTRAMUSCULAR; INTRAVENOUS at 17:33

## 2021-03-12 RX ADMIN — PROPOFOL 200 MG: 10 INJECTION, EMULSION INTRAVENOUS at 16:42

## 2021-03-12 RX ADMIN — HYDROMORPHONE HYDROCHLORIDE 0.5 MG: 1 INJECTION, SOLUTION INTRAMUSCULAR; INTRAVENOUS; SUBCUTANEOUS at 17:56

## 2021-03-12 RX ADMIN — Medication 0.6 MG: at 17:33

## 2021-03-12 RX ADMIN — Medication 0.5 MG: at 18:07

## 2021-03-12 RX ADMIN — MIDAZOLAM 2 MG: 1 INJECTION INTRAMUSCULAR; INTRAVENOUS at 16:38

## 2021-03-12 RX ADMIN — SODIUM CHLORIDE: 9 INJECTION, SOLUTION INTRAVENOUS at 12:41

## 2021-03-12 RX ADMIN — FENTANYL CITRATE 50 MCG: 50 INJECTION, SOLUTION INTRAMUSCULAR; INTRAVENOUS at 16:42

## 2021-03-12 RX ADMIN — LIDOCAINE HYDROCHLORIDE 100 MG: 20 INJECTION, SOLUTION INTRAVENOUS at 16:42

## 2021-03-12 RX ADMIN — Medication 0.5 MG: at 17:56

## 2021-03-12 RX ADMIN — HYDROMORPHONE HYDROCHLORIDE 0.5 MG: 1 INJECTION, SOLUTION INTRAMUSCULAR; INTRAVENOUS; SUBCUTANEOUS at 18:07

## 2021-03-12 RX ADMIN — KETOROLAC TROMETHAMINE 30 MG: 30 INJECTION, SOLUTION INTRAMUSCULAR at 17:33

## 2021-03-12 RX ADMIN — Medication 0.5 MG: at 18:32

## 2021-03-12 RX ADMIN — HYDROMORPHONE HYDROCHLORIDE 0.5 MG: 1 INJECTION, SOLUTION INTRAMUSCULAR; INTRAVENOUS; SUBCUTANEOUS at 18:32

## 2021-03-12 RX ADMIN — FENTANYL CITRATE 50 MCG: 50 INJECTION, SOLUTION INTRAMUSCULAR; INTRAVENOUS at 17:19

## 2021-03-12 RX ADMIN — FENTANYL CITRATE 50 MCG: 50 INJECTION, SOLUTION INTRAMUSCULAR; INTRAVENOUS at 17:13

## 2021-03-12 ASSESSMENT — PULMONARY FUNCTION TESTS
PIF_VALUE: 27
PIF_VALUE: 13
PIF_VALUE: 1
PIF_VALUE: 1
PIF_VALUE: 27
PIF_VALUE: 25
PIF_VALUE: 27
PIF_VALUE: 28
PIF_VALUE: 27
PIF_VALUE: 0
PIF_VALUE: 15
PIF_VALUE: 28
PIF_VALUE: 1
PIF_VALUE: 28
PIF_VALUE: 49
PIF_VALUE: 27
PIF_VALUE: 29
PIF_VALUE: 3
PIF_VALUE: 28
PIF_VALUE: 41
PIF_VALUE: 1
PIF_VALUE: 2
PIF_VALUE: 28
PIF_VALUE: 14
PIF_VALUE: 28
PIF_VALUE: 14
PIF_VALUE: 20
PIF_VALUE: 1
PIF_VALUE: 28
PIF_VALUE: 14

## 2021-03-12 ASSESSMENT — PAIN DESCRIPTION - PAIN TYPE
TYPE: SURGICAL PAIN

## 2021-03-12 ASSESSMENT — PAIN DESCRIPTION - DESCRIPTORS
DESCRIPTORS: SHARP
DESCRIPTORS: SHARP
DESCRIPTORS: DULL
DESCRIPTORS: SHARP
DESCRIPTORS: DULL

## 2021-03-12 ASSESSMENT — PAIN SCALES - GENERAL
PAINLEVEL_OUTOF10: 7
PAINLEVEL_OUTOF10: 3
PAINLEVEL_OUTOF10: 6
PAINLEVEL_OUTOF10: 5

## 2021-03-12 ASSESSMENT — PAIN DESCRIPTION - PROGRESSION
CLINICAL_PROGRESSION: NOT CHANGED
CLINICAL_PROGRESSION: GRADUALLY WORSENING
CLINICAL_PROGRESSION: GRADUALLY IMPROVING
CLINICAL_PROGRESSION: GRADUALLY IMPROVING

## 2021-03-12 ASSESSMENT — PAIN - FUNCTIONAL ASSESSMENT
PAIN_FUNCTIONAL_ASSESSMENT: 0-10
PAIN_FUNCTIONAL_ASSESSMENT: PREVENTS OR INTERFERES SOME ACTIVE ACTIVITIES AND ADLS
PAIN_FUNCTIONAL_ASSESSMENT: PREVENTS OR INTERFERES SOME ACTIVE ACTIVITIES AND ADLS

## 2021-03-12 ASSESSMENT — PAIN DESCRIPTION - ONSET
ONSET: GRADUAL
ONSET: ON-GOING

## 2021-03-12 ASSESSMENT — PAIN DESCRIPTION - LOCATION
LOCATION: GROIN
LOCATION: GROIN
LOCATION: ABDOMEN

## 2021-03-12 ASSESSMENT — PAIN DESCRIPTION - FREQUENCY
FREQUENCY: INTERMITTENT
FREQUENCY: INTERMITTENT

## 2021-03-12 ASSESSMENT — PAIN DESCRIPTION - ORIENTATION: ORIENTATION: LEFT

## 2021-03-12 NOTE — ANESTHESIA PRE PROCEDURE
Department of Anesthesiology  Preprocedure Note       Name:  Dwain Childers   Age:  60 y.o.  :  1955                                          MRN:  [id number]         Date:  3/12/2021      Surgeon: Shira Preston):  Walter Avila MD    Procedure: Procedure(s):  BILATERAL INGUINAL HERNIA REPAIR WITH MESH LAPAROSCOPIC ROBOTIC XI ASSISTED    Medications prior to admission:   Prior to Admission medications    Medication Sig Start Date End Date Taking? Authorizing Provider   lisinopril (PRINIVIL;ZESTRIL) 10 MG tablet Take 1 tablet by mouth daily 20   Hans Herbert MD   LINZESS 290 MCG CAPS capsule TK 1 C PO D 20   Historical Provider, MD   Naproxen Sodium (ALEVE PO) Take by mouth    Historical Provider, MD   Multiple Vitamin (MULTIVITAMINS PO) Take by mouth    Historical Provider, MD       Current medications:    No current facility-administered medications for this encounter. Current Outpatient Medications   Medication Sig Dispense Refill    lisinopril (PRINIVIL;ZESTRIL) 10 MG tablet Take 1 tablet by mouth daily 90 tablet 1    LINZESS 290 MCG CAPS capsule TK 1 C PO D      Naproxen Sodium (ALEVE PO) Take by mouth      Multiple Vitamin (MULTIVITAMINS PO) Take by mouth         Allergies:     Allergies   Allergen Reactions    Erythromycin Other (See Comments)       Problem List:    Patient Active Problem List   Diagnosis Code    Irritable bowel syndrome K58.9    Chronic pain syndrome G89.4    Cervical disc disorder M50.90    Cervical facet joint syndrome M47.812    Cervical radiculopathy M54.12    Cervical spondylosis M47.812    Foraminal stenosis of cervical region M48.02    MONTRELL positive R76.8    Multiple joint pain M25.50    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 [place] [place]    ENDOSCOPY, COLON, DIAGNOSTIC      HERNIA REPAIR  2003    Inguinal hernia    LUMBAR FUSION      L5-S1    NERVE BLOCK Right 02/10/2020    cervical C7-T1 paramedian    PAIN MANAGEMENT PROCEDURE Right 02/10/2020    CERVICAL EPIDURAL STEROID INJECTION C7-T1 RIGHT PARAMEDIAN performed by Latesha Larios MD at [facility]      UPPER GASTROINTESTINAL ENDOSCOPY  2018    [facility] [place] [place]       Social History:    Social History     Tobacco Use    Smoking status: Former Smoker     Packs/day: 1.00     Years: 30.00     Pack years: 30.00     Types: Cigarettes     Quit date:      Years since quittin.2    Smokeless tobacco: Never Used   Substance Use Topics    Alcohol use: Yes     Comment: socially                                Counseling given: Not Answered      Vital Signs (Current): There were no vitals filed for this visit.                                            BP Readings from Last 3 Encounters:   21 (!) 155/88   21 (!) 178/91   02/10/21 (!) 158/86       NPO Status:                                                                                 BMI:   Wt Readings from Last 3 Encounters:   21 205 lb (93 kg)   21 200 lb (90.7 kg)   10/14/20 200 lb (90.7 kg)     There is no height or weight on file to calculate BMI.    CBC:   Lab Results   Component Value Date    WBC 5.7 2021    RBC 4.38 2021    HGB 13.4 2021    HCT 38.9 2021    MCV 88.8 2021    RDW 13.0 2021     2021       CMP:   Lab Results   Component Value Date     2021    K 4.3 2021     2021    CO2 24 2021    BUN 13 2021    CREATININE 0.8 2021    GFRAA >60 2021    LABGLOM >60 2021    GLUCOSE 95 2021    PROT 6.8 2021    CALCIUM 8.8 2021    BILITOT 0.3 2021    ALKPHOS 67 03/08/2021    AST 21 03/08/2021    ALT 24 03/08/2021       POC Tests: No results for input(s): POCGLU, POCNA, POCK, POCCL, POCBUN, POCHEMO, POCHCT in the last 72 hours. Coags: No results found for: PROTIME, INR, APTT    HCG (If Applicable): No results found for: PREGTESTUR, PREGSERUM, HCG, HCGQUANT     ABGs: No results found for: PHART, PO2ART, CCF7SWV, MRR1RCD, BEART, D1HCFXVA     Type & Screen (If Applicable):  No results found for: LABABO, LABRH    Drug/Infectious Status (If Applicable):  No results found for: HIV, HEPCAB    COVID-19 Screening (If Applicable):   Lab Results   Component Value Date    COVID19 Not Detected 03/08/2021         Anesthesia Evaluation  Patient summary reviewed  Airway: Mallampati: III  TM distance: >3 FB   Neck ROM: full  Mouth opening: > = 3 FB Dental:      Comment: Dentition intact, patient denies any loose teeth. Pulmonary:Negative Pulmonary ROS breath sounds clear to auscultation  (+) decreased breath sounds,                            ROS comment: H/O Smoking 1 PPD x 30 years quit 1-1-2008. Cardiovascular:  Exercise tolerance: good (>4 METS),   (+) hypertension:, hyperlipidemia        Rhythm: regular  Rate: normal                    Neuro/Psych:   (+) neuromuscular disease ( Cervical radiculopathy, Cervical spondylosis , Foraminal stenosis of cervical region):,              ROS comment: H/O Chronic pain syndrome GI/Hepatic/Renal:            ROS comment: H/O IBS, bilateral inguinal hernias L>R. Endo/Other:    (+) : arthritis: OA., .                 Abdominal:   (+) obese,         Vascular: negative vascular ROS. Anesthesia Plan      general     ASA 3       Induction: intravenous. MIPS: Postoperative opioids intended. Anesthetic plan and risks discussed with patient. Plan discussed with CRNA.                   Roni Merrill MD   3/12/2021

## 2021-03-12 NOTE — OP NOTE
Operative Note  Kacie Machado MD, MS Vaibhav Gonzalez  MRN: [id number]  DATE OF PROCEDURE: 3/12/2021    PREOPERATIVE DIAGNOSIS: Symptomatic recurrent left inguinal hernia. POSTOPERATIVE DIAGNOSIS: Symptomatic recurrent left inguinal hernia. No right sided hernia     PROCEDURE: Laparoscopic robotic assisted left inguinal hernia repair with mesh. ANESTHESIA: General endotracheal.     SURGEON: Elzbieta Neumann MD     ASSISTANT: None. COMPLICATIONS: None. ESTIMATED BLOOD LOSS: Minimal.     FLUIDS: Crystalloid. SPECIMENS: none. MESH: ProGrip 16 cm x 12 cm mesh bilateral.     INDICATIONS: Vaibhav Gonzalez is a 60 y.o. male with symptomatic inguinal hernia. On physical exam, he had a palpable left inguinal hernia. Patient's had inguinal pain on the left side for several months states it radiates down his leg. Has a history 15 years ago of an open left inguinal hernia repair. He has been seeing pain management who thinks that he has an inguinal hernia. After being explained the risks, benefits, and alternatives of procedure, including but not limited to bleeding, scar, infection, recurrence he agreed to proceed. DESCRIPTION OF PROCEDURE: He was taken to the operating room, placed supine and administered general anesthesia and intubated. Once the airway was secured and he was adequately sedated, he was prepped and draped in the normal sterile fashion. Timeout was performed to confirm surgical site and the patient's name. He received preoperative antibiotics in the form of IV. We initially made an 8-mm incision superior to the umbilicus, inserted a Veress needle, confirmed the needle placement and insufflated to 15 mmHg. We then removed the Veress needle, inserted an 8-mm trocar, inserted the camera and, following, inspected the abdomen. Upon entrance into the abdomen, we identified no evidence of right inguinal hernia and a small indentation on the left. Renate Souza The patient was placed in steep Trendelenburg position and two more 8 mm trocars, 1 was inserted in the right lateral abdomen and 1 in the left lateral abdomen. The robot was then docked over the left side of the patient. We used electrocautery and scissors to dissect the preperitoneal space creating unilateral peritoneal inguinal flaps. We started on the left side and dissected out the cord structures and completely dissected out the hernia sac reducing it from the inguinal canal. There was a very small sac but extensive fibrosis extending down into the inguinal canal. Isolation of the cord structures revealed very fibrotic scar tissue within the inguinal canal. I could not easily mobilized this much of the cord out of the canal secondary to the previous hernia repair. The direct space was identified there was no obvious large inguinal hernia in this spot. Peritoneum was completely reduced from the inguinal canal. We completely exposed the pubic tubercle and Modesto's ligament. We completely reduced the hernia sac off of the cord structures. We surrounded the cord structures and skeletonized them. 3grams of Jake descicant was placed in the inguinal canal to minimize seroma formation. We then inserted a 16 cm x 12 cm piece of ProGrip mesh, placed in the inguinal canal and unrolled it. It self adhered to the inguinal canal overlapping Modesto's ligament with at least 2cm overlap. It was completely unfolded and it secured itself in place. We then closed the preperitoneal space with running 6 inch V-Loc suture. We then removed both of the needles from the V-Loc sutures, decompressed the abdomen and removed each one of our trocars. We performed inguinal block with 0.25% Marcaine and 50 mg of Kenalog in 10 mL. We then reapproximated each one of the skin incisions using 4-0 Monocryl suture. SurgiGlue was placed over the top.   The patient was awoken, extubated and transferred to the postoperative care unit in stable condition. All instrument counts, lap counts, and needle counts were correct at the completion of the procedure.     Laura Joy MD, MS  Minimally Invasive and Bariatric Surgery  [phone] (p)  3/12/2021  5:34 PM

## 2021-03-12 NOTE — H&P
organizations: Not on file     Relationship status: Not on file    Intimate partner violence     Fear of current or ex partner: Not on file     Emotionally abused: Not on file     Physically abused: Not on file     Forced sexual activity: Not on file   Other Topics Concern    Not on file   Social History Narrative    Not on file         A complete 10 system review was performed and are otherwise negative unless mentioned in the above HPI. Specific negatives are listed below but may not include all those reviewed.     General ROS: negative obtundation, AMS  ENT ROS: negative rhinorrhea, epistaxis  Allergy and Immunology ROS: negative itchy/watery eyes or nasal congestion  Hematological and Lymphatic ROS: negative spontaneous bleeding or bruising  Endocrine ROS: negative  lethargy, mood swings, palpitations or polydipsia/polyuria  Respiratory ROS: negative sputum changes, stridor, tachypnea or wheezing  Cardiovascular ROS: negative for - loss of consciousness, murmur or orthopnea  Gastrointestinal ROS: negative for - hematochezia or hematemesis  Genito-Urinary ROS: negative for -  genital discharge or hematuria  Musculoskeletal ROS: negative for - focal weakness, gangrene  Psych/Neuro ROS: negative for - visual or auditory hallucinations, suicidal ideation    Physical exam:   BP (!) 160/95   Pulse 85   Temp 98.4 °F (36.9 °C) (Infrared)   Resp 16   SpO2 96%   General appearance:  NAD, appears stated age  Head: NCAT, PERRLA, EOMI, red conjunctiva  Neck: supple, no masses, trachea midline  Lungs: Equal chest rise bilateral, no retractions, no wheezing  Heart: Reg rate  Abdomen: soft, nontender, diastasis recti  Skin; warm and dry, no cyanosis  Gu: no cva tenderness, reducible left inguinal hernia with small right inguinal hernia as well  Extremities: atraumatic, no focal motor deficits, no open wounds  Psych: No tremor, visual hallucinations      Radiology:  Radiology: I reviewed relevant abdominal imaging from this admission and that available in the EMR      Assessment:  Haydee Quarles is a 60 y.o. male with left groin pain reducible recurrent left inguinal hernia  Patient Active Problem List   Diagnosis    Irritable bowel syndrome    Chronic pain syndrome    Cervical disc disorder    Cervical facet joint syndrome    Cervical radiculopathy    Cervical spondylosis    Foraminal stenosis of cervical region    MONTRELL positive    Multiple joint pain    Osteoarthritis of elbow         Plan:  OR for laparoscopic robot assisted left inguinal hernia repair with mesh possible bilateral  Discussed the risk, benefits and alternatives of surgery including wound infections, bleeding, scar, recurrent hernia formation, mesh infection and migration, chronic groin pain, nerve injury, testicle injury, repeat procedures and the risks of general anesthetic including MI, CVA, sudden death or reactions to anesthetic medications. The patient understands the risks and alternatives and the possibility of converting to an open procedure. All questions were answered to the patient's satisfaction and they freely signed the consent.            Nabil Villasenor MD  1:10 PM  3/12/2021

## 2021-03-12 NOTE — ANESTHESIA POSTPROCEDURE EVALUATION
Department of Anesthesiology  Postprocedure Note    Patient: Valery Ibarra  MRN: [id number]  YOB: 1955  Date of evaluation: 3/12/2021  Time:  6:54 PM     Procedure Summary     Date: 03/12/21 Room / Location: [address] [place] [address] 06 / 4199 [facility]    Anesthesia Start: 9796 Anesthesia Stop: 4621    Procedure: LEFT  INGUINAL HERNIA REPAIR WITH MESH LAPAROSCOPIC ROBOTIC XI ASSISTED (N/A Abdomen) Diagnosis: (BILATERAL INGUINAL HERNIA)    Surgeons: Octaviano Ormond, MD Responsible Provider: Juliet Ba MD    Anesthesia Type: general ASA Status: 3          Anesthesia Type: general    Kaylin Phase I: Kaylin Score: 10    Kaylin Phase II:      Last vitals: Reviewed and per EMR flowsheets.        Anesthesia Post Evaluation    Patient location during evaluation: PACU  Patient participation: complete - patient participated  Level of consciousness: awake and alert  Pain score: 3  Airway patency: patent  Nausea & Vomiting: no nausea and no vomiting  Complications: no  Cardiovascular status: hemodynamically stable  Hydration status: euvolemic

## 2021-03-13 NOTE — PROGRESS NOTES
3/12/21 2030 reviewed discharge instructions with pt and his wife stan. Both verbalized understanding,signed in agreement and verbalized understanding.  Pt had a small emesis of undigested food states\" feels better\" offered pt medication for nausea states\" I really feel better\" states \"ready to go home\" jo rn

## 2021-03-24 ENCOUNTER — OFFICE VISIT (OUTPATIENT)
Dept: SURGERY | Age: 66
End: 2021-03-24

## 2021-03-24 VITALS
WEIGHT: 205 LBS | DIASTOLIC BLOOD PRESSURE: 81 MMHG | HEART RATE: 78 BPM | SYSTOLIC BLOOD PRESSURE: 177 MMHG | HEIGHT: 69 IN | BODY MASS INDEX: 30.36 KG/M2 | TEMPERATURE: 99.1 F

## 2021-03-24 DIAGNOSIS — K40.91 RECURRENT LEFT INGUINAL HERNIA: Primary | ICD-10-CM

## 2021-03-24 PROCEDURE — 99024 POSTOP FOLLOW-UP VISIT: CPT | Performed by: SURGERY

## 2021-03-24 NOTE — PROGRESS NOTES
genital discharge, genital ulcers or hematuria  Musculoskeletal ROS: negative for - gait disturbance, muscle pain or muscular weakness  Psych/Neuro ROS: negative for - visual or auditory hallucinations, suicidal ideation    General appearance: AA, NAD  HEENT: NCAT, PERRLA, EOMI  Lungs: Clear, equal rise bilateral  Heart: Reg  Abdomen: soft, nondistended, nontender, incisions well healed, no signs of infection, no cellulitis, no hematoma  Ext: Atraumatic, no swelling  : No hernia recurrence        Assessment/Plan:  Gaby Arenas is a 60 y.o. male 2 weeks s/p laparoscopic robot assisted left inguinal hernia repair with mesh. Doing well.     Resume activity gradually   No heavy lifting more than 20lbs for 4 weeks total  Pathology reviewed and copy provided  Follow up as needed    Physician Signature: Electronically signed by Dr. James Pulido  [phone] (p)  3/24/2021  12:59 PM

## 2021-06-16 DIAGNOSIS — I10 HYPERTENSION, UNSPECIFIED TYPE: ICD-10-CM

## 2021-06-16 RX ORDER — LISINOPRIL 10 MG/1
10 TABLET ORAL DAILY
Qty: 90 TABLET | Refills: 1 | OUTPATIENT
Start: 2021-06-16

## 2021-06-23 ENCOUNTER — TELEPHONE (OUTPATIENT)
Dept: INTERNAL MEDICINE | Age: 66
End: 2021-06-23

## 2021-06-23 NOTE — TELEPHONE ENCOUNTER
Patient called at 7:50. Refill needed Lisinopril 90 day supply with refill.   [facility] [phone]

## 2021-06-24 ENCOUNTER — TELEPHONE (OUTPATIENT)
Dept: INTERNAL MEDICINE | Age: 66
End: 2021-06-24

## 2021-06-24 ENCOUNTER — TELEPHONE (OUTPATIENT)
Dept: FAMILY MEDICINE CLINIC | Age: 66
End: 2021-06-24

## 2021-06-24 NOTE — TELEPHONE ENCOUNTER
Pt requesting refill for lisinopril ibuprofen explained to pt he was last seen virtually in dec and would need and appt in order for meds to be prescribed. Pt states he is not coming in to the clinic until covid is over. I explained to pt legally pt has to be seen or active in order for meds to be ordered. Pt states his wife works in respiratory and he is not coming in until this covid thing is over .  Pt staes he isnt coming in he will just find another

## 2021-06-24 NOTE — TELEPHONE ENCOUNTER
Good Afternoon Dr. Johnson Desai  Pt was highly recommended to you by peers and Dr. West Restrepo. 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 He was going to . He does not like his experience there. He would like to be seen sooner than what I offered for the [facility], which would be 08/15, since he currently on BP medication. Would you be willing to accept at a sooner date in [employer]? Thank you for your time!

## 2021-06-29 NOTE — TELEPHONE ENCOUNTER
Called patient and left a message for him to return my call at his earliest convenience so that I could schedule his new patient appointment

## 2021-06-30 NOTE — TELEPHONE ENCOUNTER
Patient returned call to Todd Moran MA who was not available at time of call. Informed patient will send msg. Patient stated he will have his cell phone with him and can be reached anytime today.   [phone]

## 2021-07-01 NOTE — TELEPHONE ENCOUNTER
Pt called back to sched np appt w/Dr Jewel Malhotra. He states he needs meds refilled soon; requesting appt asap. Please advise.

## 2021-07-02 NOTE — TELEPHONE ENCOUNTER
Patient's daughter, Tyler Loomis, called to follow up on scheduling patient. Informed that Alphonso Bneítez MA has the msgs and that she will contact patient. Tyler Loomis stated patient is almost out of medication. Informed that RX cannot be sent til patient is established. Tyler Loomis stated patient may be willing to see another provider at [facility] and will discuss with him, but prefers to see Dr. Shabana Mccord at this point.

## 2021-07-02 NOTE — TELEPHONE ENCOUNTER
Pt called and expressed concern that he has not yet been contacted about appt, and he is out of bp meds. Please contact pt. He would be willing to see any other provider if able to get him in sooner.

## 2021-07-02 NOTE — TELEPHONE ENCOUNTER
I spoke w/Irina REYEZ MA who stated OK to make appt 7/7/21 at 11:45 am.  Informed patient [facility] may call that morning to move appt up. Advised to bring medications in bottles, photo ID, insurance, co-pay and wear a mask.

## 2021-07-07 ENCOUNTER — OFFICE VISIT (OUTPATIENT)
Dept: FAMILY MEDICINE CLINIC | Age: 66
End: 2021-07-07
Payer: COMMERCIAL

## 2021-07-07 VITALS
HEIGHT: 69 IN | SYSTOLIC BLOOD PRESSURE: 160 MMHG | OXYGEN SATURATION: 99 % | TEMPERATURE: 97.5 F | BODY MASS INDEX: 30.1 KG/M2 | HEART RATE: 101 BPM | DIASTOLIC BLOOD PRESSURE: 90 MMHG | RESPIRATION RATE: 16 BRPM | WEIGHT: 203.2 LBS

## 2021-07-07 DIAGNOSIS — Z12.5 SCREENING PSA (PROSTATE SPECIFIC ANTIGEN): ICD-10-CM

## 2021-07-07 DIAGNOSIS — Z76.89 ESTABLISHING CARE WITH NEW DOCTOR, ENCOUNTER FOR: Primary | ICD-10-CM

## 2021-07-07 DIAGNOSIS — I10 HYPERTENSION, UNSPECIFIED TYPE: ICD-10-CM

## 2021-07-07 DIAGNOSIS — K58.9 IRRITABLE BOWEL SYNDROME, UNSPECIFIED TYPE: ICD-10-CM

## 2021-07-07 PROCEDURE — 99204 OFFICE O/P NEW MOD 45 MIN: CPT | Performed by: INTERNAL MEDICINE

## 2021-07-07 RX ORDER — LISINOPRIL 10 MG/1
10 TABLET ORAL DAILY
Qty: 90 TABLET | Refills: 0 | Status: SHIPPED
Start: 2021-07-07 | End: 2021-08-16 | Stop reason: SDUPTHER

## 2021-07-07 SDOH — ECONOMIC STABILITY: FOOD INSECURITY: WITHIN THE PAST 12 MONTHS, THE FOOD YOU BOUGHT JUST DIDN'T LAST AND YOU DIDN'T HAVE MONEY TO GET MORE.: NEVER TRUE

## 2021-07-07 SDOH — ECONOMIC STABILITY: FOOD INSECURITY: WITHIN THE PAST 12 MONTHS, YOU WORRIED THAT YOUR FOOD WOULD RUN OUT BEFORE YOU GOT MONEY TO BUY MORE.: NEVER TRUE

## 2021-07-07 ASSESSMENT — PATIENT HEALTH QUESTIONNAIRE - PHQ9
SUM OF ALL RESPONSES TO PHQ QUESTIONS 1-9: 0
2. FEELING DOWN, DEPRESSED OR HOPELESS: 0
SUM OF ALL RESPONSES TO PHQ QUESTIONS 1-9: 0
SUM OF ALL RESPONSES TO PHQ QUESTIONS 1-9: 0
1. LITTLE INTEREST OR PLEASURE IN DOING THINGS: 0
SUM OF ALL RESPONSES TO PHQ9 QUESTIONS 1 & 2: 0

## 2021-07-07 ASSESSMENT — ENCOUNTER SYMPTOMS
EYE DISCHARGE: 0
ABDOMINAL PAIN: 0
BLOOD IN STOOL: 0
NAUSEA: 0
SHORTNESS OF BREATH: 0

## 2021-07-07 ASSESSMENT — SOCIAL DETERMINANTS OF HEALTH (SDOH): HOW HARD IS IT FOR YOU TO PAY FOR THE VERY BASICS LIKE FOOD, HOUSING, MEDICAL CARE, AND HEATING?: NOT HARD AT ALL

## 2021-07-07 NOTE — PATIENT INSTRUCTIONS
The medication list included in this document is our record of what you are currently taking, including any changes that were made at today's visit.  If you find any differences when compared to your medications at home, or have any questions that were not answered at your visit, please contact the office. Monitor BP and Keep a log, Bring it with next visit    Advise to have ( Fasting) lab test prior to next visit.

## 2021-07-07 NOTE — PROGRESS NOTES
Chief Complaint   Patient presents with   [facility] New Doctor     Previous PCP was Dr Shanell Nieves on [address]     Medication Refill    Hypertension    Other     [address] 89 had a colonoscopy 3 years ago had [employer] sign a release for [facility]        HPI:  Patient is here as new patient    Pt is out of BP medication for about 2 weks       Allergy and Medications are reviewed and updated. Past Medical History, Surgical History, and Family History has been reviewed and updated. Review of Systems:  Review of Systems   Constitutional: Negative for chills and fever. HENT: Negative for congestion and ear pain. Eyes: Negative for discharge. Respiratory: Negative for shortness of breath (No new SOB). Cardiovascular: Negative for chest pain and leg swelling. Gastrointestinal: Negative for abdominal pain, blood in stool and nausea. Endocrine: Negative for polydipsia. Genitourinary: Negative for flank pain and hematuria. Musculoskeletal: Negative for myalgias and neck pain. Skin: Negative for rash. Neurological: Negative for dizziness and seizures. Hematological: Does not bruise/bleed easily. Psychiatric/Behavioral: Negative for hallucinations and suicidal ideas. Vitals:    07/07/21 1157 07/07/21 1200   BP: (!) 160/90 (!) 160/90   Pulse: 101    Resp: 16    Temp: 97.5 °F (36.4 °C)    TempSrc: Temporal    SpO2: 99%    Weight: 203 lb 3.2 oz (92.2 kg)    Height: 5' 9\" (1.753 m)        Physical Exam  Vitals reviewed. Constitutional:       Appearance: He is well-developed. HENT:      Head: Normocephalic and atraumatic. Eyes:      Conjunctiva/sclera: Conjunctivae normal.      Pupils: Pupils are equal, round, and reactive to light. Neck:      Vascular: No JVD. Cardiovascular:      Rate and Rhythm: Normal rate and regular rhythm. Pulmonary:      Effort: Pulmonary effort is normal.      Breath sounds: Normal breath sounds. No rales.    Abdominal:      General: Bowel sounds are normal.      Palpations: Abdomen is soft. Musculoskeletal:         General: Normal range of motion. Lymphadenopathy:      Cervical: No cervical adenopathy. Skin:     General: Skin is warm and dry. Neurological:      Mental Status: He is alert and oriented to person, place, and time. Psychiatric:         Behavior: Behavior normal.          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 Establishing care with new doctor, encounter for  Z76.89    2. Hypertension, unspecified type  I10 lisinopril (PRINIVIL;ZESTRIL) 10 MG tablet     CBC Auto Differential     Comprehensive Metabolic Panel, Fasting     Lipid, Fasting     TSH without Reflex     Urinalysis with Microscopic   3. Screening PSA (prostate specific antigen)  Z12.5 PSA screening   4. Irritable bowel syndrome, unspecified type [facility]   K58.9        PLAN:       Monitor BP and Keep a log, Bring it with next visit      Advise to have ( Fasting) lab test prior to next visit.      Pt is stable on current medical treatment. Continue current treatment plan    Side effects/Adverse effects/Precautions are reviewed with patient. Low salt, Low Carb diet an low fat diet  Continue medications as advised and take them regularly  Regular exercises as advised    Discussed natural and expected course of this diagnosis and need to alert me if symptoms do not follow expected course, or if any worse. Smoking cessation if applicable, discussed with patient. Discussed with him about AAA screen -     Patient Instructions   The medication list included in this document is our record of what you are currently taking, including any changes that were made at today's visit.  If you find any differences when compared to your medications at home, or have any questions that were not answered at your visit, please contact the office. Monitor BP and Keep a log, Bring it with next visit    Advise to have ( Fasting) lab test prior to next visit. Return in about 6 weeks (around 8/18/2021).

## 2021-08-16 ENCOUNTER — OFFICE VISIT (OUTPATIENT)
Dept: FAMILY MEDICINE CLINIC | Age: 66
End: 2021-08-16
Payer: COMMERCIAL

## 2021-08-16 VITALS
SYSTOLIC BLOOD PRESSURE: 160 MMHG | HEIGHT: 69 IN | DIASTOLIC BLOOD PRESSURE: 80 MMHG | WEIGHT: 203.2 LBS | TEMPERATURE: 97.6 F | BODY MASS INDEX: 30.1 KG/M2 | OXYGEN SATURATION: 97 % | RESPIRATION RATE: 16 BRPM | HEART RATE: 70 BPM

## 2021-08-16 DIAGNOSIS — L72.0 EPIDERMOID CYST OF FINGER: Primary | ICD-10-CM

## 2021-08-16 DIAGNOSIS — I10 HYPERTENSION, UNSPECIFIED TYPE: ICD-10-CM

## 2021-08-16 PROCEDURE — 99214 OFFICE O/P EST MOD 30 MIN: CPT | Performed by: INTERNAL MEDICINE

## 2021-08-16 RX ORDER — LISINOPRIL 10 MG/1
10 TABLET ORAL 2 TIMES DAILY
Qty: 60 TABLET | Refills: 2 | Status: SHIPPED
Start: 2021-08-16 | End: 2021-10-04 | Stop reason: SDUPTHER

## 2021-08-16 ASSESSMENT — ENCOUNTER SYMPTOMS
SINUS PAIN: 0
NAUSEA: 0
SHORTNESS OF BREATH: 0
EYE PAIN: 0
EYE DISCHARGE: 0
SORE THROAT: 0
BLOOD IN STOOL: 0
ABDOMINAL PAIN: 0

## 2021-08-16 NOTE — PROGRESS NOTES
Chief Complaint   Patient presents with    Discuss Labs    Nail Problem     Left middle finger nail has split and above fingernail he states he has a cyst it started a month and a half ago        HPI:  Patient is here for follow-up     Above noted    Here with Wife Yaya Vigil        Allergy and Medications are reviewed and updated. Past Medical History, Surgical History, and Family History has been reviewed and updated. Review of Systems:  Review of Systems   Constitutional: Negative for chills and fever. HENT: Negative for congestion, sinus pain and sore throat. Eyes: Negative for pain and discharge. Respiratory: Negative for shortness of breath (No new SOb). Cardiovascular: Negative for chest pain. Gastrointestinal: Negative for abdominal pain, blood in stool and nausea. Genitourinary: Negative for flank pain and frequency. Musculoskeletal: Negative for neck pain. Hematological: Does not bruise/bleed easily. Psychiatric/Behavioral: Negative for suicidal ideas. Vitals:    08/16/21 1523 08/16/21 1529   BP: (!) 150/80 (!) 160/80   Pulse: 70    Resp: 16    Temp: 97.6 °F (36.4 °C)    TempSrc: Temporal    SpO2: 97%    Weight: 203 lb 3.2 oz (92.2 kg)    Height: 5' 9\" (1.753 m)        Physical Exam  Vitals reviewed. Constitutional:       Appearance: He is well-developed. HENT:      Head: Normocephalic and atraumatic. Eyes:      Conjunctiva/sclera: Conjunctivae normal.      Pupils: Pupils are equal, round, and reactive to light. Neck:      Vascular: No JVD. Cardiovascular:      Rate and Rhythm: Normal rate and regular rhythm. Pulmonary:      Effort: Pulmonary effort is normal.      Breath sounds: Normal breath sounds. No rales. Abdominal:      General: Bowel sounds are normal.      Palpations: Abdomen is soft. Musculoskeletal:         General: Normal range of motion. Lymphadenopathy:      Cervical: No cervical adenopathy. Skin:     General: Skin is warm and dry. Neurological:      Mental Status: He is alert and oriented to person, place, and time. Psychiatric:         Behavior: Behavior normal.          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 Epidermoid cyst of finger  L72.0 External Referral To Dermatology   2. Hypertension, unspecified type  I10 lisinopril (PRINIVIL;ZESTRIL) 10 MG tablet       PLAN:        BP is noted    Increase lisinopril to 10 mg bid    Monitor BP and Keep a log, Bring it with next visit    Ref to Dermatology     Pt is stable on current medical treatment. Continue current treatment plan    Side effects/Adverse effects/Precautions are reviewed with patient. Low salt, Low Carb diet an low fat diet  Continue medications as advised and take them regularly  Regular exercises as advised    Discussed natural and expected course of this diagnosis and need to alert me if symptoms do not follow expected course, or if any worse.     Smoking cessation if applicable, discussed with patient. Patient Instructions   The medication list included in this document is our record of what you are currently taking, including any changes that were made at today's visit.  If you find any differences when compared to your medications at home, or have any questions that were not answered at your visit, please contact the office. Monitor BP and Keep a log, Bring it with next visit          Return in about 2 months (around 10/16/2021).

## 2021-10-04 DIAGNOSIS — I10 HYPERTENSION, UNSPECIFIED TYPE: ICD-10-CM

## 2021-10-04 RX ORDER — LISINOPRIL 10 MG/1
10 TABLET ORAL 2 TIMES DAILY
Qty: 60 TABLET | Refills: 2 | Status: SHIPPED
Start: 2021-10-04 | End: 2022-02-07

## 2021-12-27 ENCOUNTER — TELEPHONE (OUTPATIENT)
Dept: FAMILY MEDICINE CLINIC | Age: 66
End: 2021-12-27

## 2021-12-27 DIAGNOSIS — U07.1 COVID-19: Primary | ICD-10-CM

## 2021-12-27 RX ORDER — AZITHROMYCIN 250 MG/1
250 TABLET, FILM COATED ORAL SEE ADMIN INSTRUCTIONS
Qty: 6 TABLET | Refills: 0 | Status: SHIPPED | OUTPATIENT
Start: 2021-12-27 | End: 2022-01-01

## 2021-12-27 RX ORDER — METHYLPREDNISOLONE 4 MG/1
TABLET ORAL
Qty: 1 KIT | Refills: 0 | Status: SHIPPED | OUTPATIENT
Start: 2021-12-27 | End: 2022-01-02

## 2021-12-27 NOTE — TELEPHONE ENCOUNTER
Patient's wife called stating patient took an at home COVID test which is positive. Patient's wife stated patient c/o cough, sore throat, fever, body aches and has underlying health issues. Wife stated she would like a steroid sent to pharmacy since she is a respiratory therapist at [employer] and has seen what can happen if not treated. Informed that Dr. Medardo Kirk is not in the ofc and will send msg to Dr. Bruce García.     Last seen 8/16/2021  Next appt 2/9/2022  Josefina

## 2021-12-27 NOTE — TELEPHONE ENCOUNTER
Spoke to patient and his wife who stated patient experiences GI side effects when he tried eryc 250 in the past, He never has a rash or any other symptoms. Informed them that medication was sent to their pharmacy.

## 2022-02-06 DIAGNOSIS — I10 HYPERTENSION, UNSPECIFIED TYPE: ICD-10-CM

## 2022-02-06 RX ORDER — LISINOPRIL 10 MG/1
TABLET ORAL
Qty: 60 TABLET | Refills: 2 | Status: CANCELLED | OUTPATIENT
Start: 2022-02-06

## 2022-02-07 RX ORDER — LISINOPRIL 10 MG/1
TABLET ORAL
Qty: 60 TABLET | Refills: 2 | Status: SHIPPED
Start: 2022-02-07 | End: 2022-02-09 | Stop reason: SDUPTHER

## 2022-02-09 ENCOUNTER — OFFICE VISIT (OUTPATIENT)
Dept: FAMILY MEDICINE CLINIC | Age: 67
End: 2022-02-09
Payer: COMMERCIAL

## 2022-02-09 VITALS
TEMPERATURE: 97.8 F | HEIGHT: 69 IN | OXYGEN SATURATION: 100 % | DIASTOLIC BLOOD PRESSURE: 82 MMHG | HEART RATE: 66 BPM | SYSTOLIC BLOOD PRESSURE: 180 MMHG | WEIGHT: 204.7 LBS | BODY MASS INDEX: 30.32 KG/M2

## 2022-02-09 DIAGNOSIS — I10 HYPERTENSION, UNSPECIFIED TYPE: ICD-10-CM

## 2022-02-09 PROCEDURE — 99213 OFFICE O/P EST LOW 20 MIN: CPT | Performed by: INTERNAL MEDICINE

## 2022-02-09 RX ORDER — LISINOPRIL 10 MG/1
10 TABLET ORAL 2 TIMES DAILY
Qty: 180 TABLET | Refills: 0 | Status: SHIPPED
Start: 2022-02-09 | End: 2022-03-22 | Stop reason: SDUPTHER

## 2022-02-09 RX ORDER — LISINOPRIL 10 MG/1
10 TABLET ORAL DAILY
Qty: 90 TABLET | Refills: 0 | Status: CANCELLED | OUTPATIENT
Start: 2022-02-09 | End: 2022-05-10

## 2022-02-09 ASSESSMENT — ENCOUNTER SYMPTOMS
EYE DISCHARGE: 0
BLOOD IN STOOL: 0
ABDOMINAL PAIN: 0
NAUSEA: 0
SHORTNESS OF BREATH: 0

## 2022-02-09 NOTE — PROGRESS NOTES
Chief Complaint   Patient presents with    Hypertension     bp log brought in today     Medication Refill    Health Maintenance     pt had colonscopy 3 years ago release signed /pt had flu vaccine at [facility]        HPI:  Patient is here for follow-up     Feel okay        Allergy and Medications are reviewed and updated. Past Medical History, Surgical History, and Family History has been reviewed and updated. Review of Systems:  Review of Systems   Constitutional: Negative for chills and fever. HENT: Negative for congestion and ear pain. Eyes: Negative for discharge. Respiratory: Negative for shortness of breath (No new SOB). Cardiovascular: Negative for chest pain and leg swelling. Gastrointestinal: Negative for abdominal pain, blood in stool and nausea. Endocrine: Negative for polydipsia. Genitourinary: Negative for flank pain and hematuria. Musculoskeletal: Negative for myalgias and neck pain. Skin: Negative for rash. Neurological: Negative for dizziness and seizures. Hematological: Does not bruise/bleed easily. Psychiatric/Behavioral: Negative for hallucinations and suicidal ideas. Vitals:    02/09/22 1458 02/09/22 1510   BP: (!) 180/100 (!) 180/82   Pulse: 66    Temp: 97.8 °F (36.6 °C)    TempSrc: Temporal    SpO2: 100%    Weight: 204 lb 11.2 oz (92.9 kg)    Height: 5' 9\" (1.753 m)        Physical Exam  Vitals reviewed. Constitutional:       Appearance: He is well-developed. HENT:      Head: Normocephalic and atraumatic. Eyes:      Conjunctiva/sclera: Conjunctivae normal.      Pupils: Pupils are equal, round, and reactive to light. Neck:      Vascular: No JVD. Cardiovascular:      Rate and Rhythm: Normal rate and regular rhythm. Pulmonary:      Effort: Pulmonary effort is normal.      Breath sounds: Normal breath sounds. No rales. Abdominal:      General: Bowel sounds are normal.      Palpations: Abdomen is soft.    Musculoskeletal:         General: Normal range of motion. Lymphadenopathy:      Cervical: No cervical adenopathy. Skin:     General: Skin is warm and dry. Neurological:      Mental Status: He is alert and oriented to person, place, and time. Psychiatric:         Behavior: Behavior normal.          Labs :    Lab Results   Component Value Date    WBC 5.7 08/13/2021    HGB 13.8 08/13/2021    HCT 40.3 08/13/2021     08/13/2021    HDL 33 08/13/2021    ALT 21 08/13/2021    AST 23 08/13/2021     08/13/2021    K 4.1 08/13/2021     08/13/2021    CREATININE 0.9 08/13/2021    BUN 17 08/13/2021    CO2 22 08/13/2021    TSH 1.450 08/13/2021    PSA 1.04 08/13/2021    GLUF 101 (H) 08/13/2021    LABA1C 5.7 (H) 11/21/2019     Lab Results   Component Value Date    COLORU Yellow 08/16/2021    NITRU Negative 08/16/2021    GLUCOSEU Negative 08/16/2021    KETUA Negative 08/16/2021    UROBILINOGEN 0.2 08/16/2021    BILIRUBINUR Negative 08/16/2021     Lab Results   Component Value Date    PSA 1.04 08/13/2021             ASSESSMENT     Patient Active Problem List    Diagnosis Date Noted    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 Hypertension, unspecified type  I10 lisinopril (PRINIVIL;ZESTRIL) 10 MG tablet       PLAN:      BP reading noted    Pt says he misses dose frequently, \" Very busy with life\"    Reminded to take medicine regularly    RF is given    Pt is stable on current medical treatment. Continue current treatment plan    Side effects/Adverse effects/Precautions are reviewed with patient.      Low salt, Low Carb diet an low fat diet  Continue medications as advised and take them regularly  Regular exercises as advised    Discussed natural and expected course of this diagnosis and need to alert me if symptoms do not follow expected course, or if any worse. Smoking cessation if applicable, discussed with patient. Adv to follow up in 4-6 weeks  Pt adamantly says he can not come back -     The 10-year ASCVD risk score (hSira Ramos, et al., 2013) is: 29.4%    Values used to calculate the score:      Age: 73 years      Sex: Male      Is Non- : No      Diabetic: No      Tobacco smoker: No      Systolic Blood Pressure: 425 mmHg      Is BP treated: Yes      HDL Cholesterol: 33 mg/dL      Total Cholesterol: 178 mg/dL    Above reviewed with patient           Patient Instructions   ----      Return in about 3 months (around 5/9/2022).

## 2022-03-21 DIAGNOSIS — I10 HYPERTENSION, UNSPECIFIED TYPE: ICD-10-CM

## 2022-03-21 NOTE — TELEPHONE ENCOUNTER
----- Message from Benjamín Gil sent at 3/21/2022  9:13 AM EDT -----  Subject: Refill Request    QUESTIONS  Name of Medication? lisinopril (PRINIVIL;ZESTRIL) 10 MG tablet  Patient-reported dosage and instructions? Take (2) 10MG a day  How many days do you have left? 0  Preferred Pharmacy? [id number]  Pharmacy phone number (if available)? [phone]  Additional Information for Provider? Patient calling to get refill on   Lisinopril states that he is currently out, last appointment 2/9/2022. Please call patient when prescription has been sent.  ---------------------------------------------------------------------------  --------------  CALL BACK INFO  What is the best way for the office to contact you? OK to leave message on   voicemail  Preferred Call Back Phone Number?  [phone]

## 2022-03-22 RX ORDER — LISINOPRIL 10 MG/1
10 TABLET ORAL 2 TIMES DAILY
Qty: 180 TABLET | Refills: 0 | Status: SHIPPED
Start: 2022-03-22 | End: 2022-05-11 | Stop reason: SDUPTHER

## 2022-05-11 ENCOUNTER — OFFICE VISIT (OUTPATIENT)
Dept: FAMILY MEDICINE CLINIC | Age: 67
End: 2022-05-11
Payer: COMMERCIAL

## 2022-05-11 VITALS
BODY MASS INDEX: 29.44 KG/M2 | HEART RATE: 81 BPM | SYSTOLIC BLOOD PRESSURE: 170 MMHG | WEIGHT: 198.8 LBS | OXYGEN SATURATION: 99 % | DIASTOLIC BLOOD PRESSURE: 76 MMHG | HEIGHT: 69 IN | TEMPERATURE: 98.7 F

## 2022-05-11 DIAGNOSIS — M48.02 CERVICAL SPINAL STENOSIS: ICD-10-CM

## 2022-05-11 DIAGNOSIS — Z11.59 NEED FOR HEPATITIS C SCREENING TEST: ICD-10-CM

## 2022-05-11 DIAGNOSIS — I10 HYPERTENSION, UNSPECIFIED TYPE: Primary | ICD-10-CM

## 2022-05-11 DIAGNOSIS — R73.09 ELEVATED HEMOGLOBIN A1C: ICD-10-CM

## 2022-05-11 DIAGNOSIS — Z12.5 SCREENING PSA (PROSTATE SPECIFIC ANTIGEN): ICD-10-CM

## 2022-05-11 DIAGNOSIS — M48.02 FORAMINAL STENOSIS OF CERVICAL REGION: ICD-10-CM

## 2022-05-11 PROCEDURE — 99214 OFFICE O/P EST MOD 30 MIN: CPT | Performed by: INTERNAL MEDICINE

## 2022-05-11 RX ORDER — LISINOPRIL 10 MG/1
10 TABLET ORAL 2 TIMES DAILY
Qty: 180 TABLET | Refills: 1 | Status: SHIPPED | OUTPATIENT
Start: 2022-05-11

## 2022-05-11 RX ORDER — AMLODIPINE BESYLATE 5 MG/1
5 TABLET ORAL DAILY
Qty: 90 TABLET | Refills: 1 | Status: SHIPPED | OUTPATIENT
Start: 2022-05-11

## 2022-05-11 ASSESSMENT — ENCOUNTER SYMPTOMS
NAUSEA: 0
SHORTNESS OF BREATH: 0
ABDOMINAL PAIN: 0
EYE DISCHARGE: 0
BLOOD IN STOOL: 0

## 2022-05-11 NOTE — PROGRESS NOTES
Chief Complaint   Patient presents with    Hypertension     BP log brought today     Medication Refill    Health Maintenance     Shingles Vaccine/ Pneumonia Vaccine         HPI:  Patient is here for follow-up    Pt is here with BP log        Allergy and Medications are reviewed and updated. Past Medical History, Surgical History, and Family History has been reviewed and updated. Review of Systems:  Review of Systems   Constitutional: Negative for chills and fever. HENT: Negative for congestion and ear pain. Eyes: Negative for discharge. Respiratory: Negative for shortness of breath (No new SOB). Cardiovascular: Negative for chest pain and leg swelling. Gastrointestinal: Negative for abdominal pain, blood in stool and nausea. Endocrine: Negative for polydipsia. Genitourinary: Negative for flank pain and hematuria. Musculoskeletal: Negative for myalgias and neck pain. Skin: Negative for rash. Neurological: Negative for dizziness and seizures. Hematological: Does not bruise/bleed easily. Psychiatric/Behavioral: Negative for hallucinations and suicidal ideas. Vitals:    05/11/22 1450 05/11/22 1501 05/11/22 1536   BP: (!) 196/74 (!) 176/76 (!) 170/76   Position: Sitting     Pulse: 81     Temp: 98.7 °F (37.1 °C)     TempSrc: Temporal     SpO2: 99%     Weight: 198 lb 12.8 oz (90.2 kg)     Height: 5' 9\" (1.753 m)         Physical Exam  Vitals reviewed. Constitutional:       Appearance: He is well-developed. HENT:      Head: Normocephalic and atraumatic. Eyes:      Conjunctiva/sclera: Conjunctivae normal.      Pupils: Pupils are equal, round, and reactive to light. Neck:      Vascular: No JVD. Cardiovascular:      Rate and Rhythm: Normal rate and regular rhythm. Pulmonary:      Effort: Pulmonary effort is normal.      Breath sounds: Normal breath sounds. No rales. Abdominal:      General: Bowel sounds are normal.      Palpations: Abdomen is soft.    Musculoskeletal: General: Normal range of motion. Lymphadenopathy:      Cervical: No cervical adenopathy. Skin:     General: Skin is warm and dry. Neurological:      Mental Status: He is alert and oriented to person, place, and time. Psychiatric:         Behavior: Behavior normal.          Labs :    Lab Results   Component Value Date    WBC 5.7 08/13/2021    HGB 13.8 08/13/2021    HCT 40.3 08/13/2021     08/13/2021    HDL 33 08/13/2021    ALT 21 08/13/2021    AST 23 08/13/2021     08/13/2021    K 4.1 08/13/2021     08/13/2021    CREATININE 0.9 08/13/2021    BUN 17 08/13/2021    CO2 22 08/13/2021    TSH 1.450 08/13/2021    PSA 1.04 08/13/2021    GLUF 101 (H) 08/13/2021    LABA1C 5.7 (H) 11/21/2019     Lab Results   Component Value Date    COLORU Yellow 08/16/2021    NITRU Negative 08/16/2021    GLUCOSEU Negative 08/16/2021    KETUA Negative 08/16/2021    UROBILINOGEN 0.2 08/16/2021    BILIRUBINUR Negative 08/16/2021     Lab Results   Component Value Date    PSA 1.04 08/13/2021             ASSESSMENT     Patient Active Problem List    Diagnosis Date Noted    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 Hypertension, unspecified type  I10 lisinopril (PRINIVIL;ZESTRIL) 10 MG tablet     amLODIPine (NORVASC) 5 MG tablet     CBC with Auto Differential     Comprehensive Metabolic Panel, Fasting     Lipid, Fasting     TSH     Urinalysis with Microscopic   2. Foraminal stenosis of cervical region  M48.02    3. Cervical spinal stenosis  M48.02    4.  Screening PSA (prostate specific antigen)  Z12.5 PSA Screening   5. Elevated hemoglobin A1c  R73.09 Hemoglobin A1C   6. Need for hepatitis C screening test  Z11.59 Hepatitis C Antibody       PLAN:      BP log is reviewed    Still not well controlled    Add Norvasc 5 mg daily    Monitor BP and Keep a log, Bring it with next visit      Pt is stable on current medical treatment. Continue current treatment plan    Side effects/Adverse effects/Precautions are reviewed with patient. Low salt, Low Carb diet an low fat diet  Continue medications as advised and take them regularly  Regular exercises as advised    Discussed natural and expected course of this diagnosis and need to alert me if symptoms do not follow expected course, or if any worse. Smoking cessation if applicable, discussed with patient. HM- recommendations are reviewed-    AAA screen, LDCT , Pneumo vaccine, Shingles Vaccine- declines at preset but will think about it            Patient Instructions   The medication list included in this document is our record of what you are currently taking, including any changes that were made at today's visit.  If you find any differences when compared to your medications at home, or have any questions that were not answered at your visit, please contact the office. Advise to have ( Fasting) lab test prior to next visit. Return in about 3 months (around 8/11/2022) for Pt would like to come back in 3-4 months.

## 2022-05-13 DIAGNOSIS — I10 HYPERTENSION, UNSPECIFIED TYPE: ICD-10-CM

## 2022-05-13 RX ORDER — LISINOPRIL 10 MG/1
TABLET ORAL
Qty: 180 TABLET | Refills: 1 | OUTPATIENT
Start: 2022-05-13

## 2022-11-25 DIAGNOSIS — Z11.59 NEED FOR HEPATITIS C SCREENING TEST: ICD-10-CM

## 2022-11-25 DIAGNOSIS — I10 HYPERTENSION, UNSPECIFIED TYPE: ICD-10-CM

## 2022-11-25 DIAGNOSIS — Z12.5 SCREENING PSA (PROSTATE SPECIFIC ANTIGEN): ICD-10-CM

## 2022-11-25 DIAGNOSIS — R73.09 ELEVATED HEMOGLOBIN A1C: ICD-10-CM

## 2022-11-25 LAB
ALBUMIN SERPL-MCNC: 4.5 G/DL (ref 3.5–5.2)
ALP BLD-CCNC: 86 U/L (ref 40–129)
ALT SERPL-CCNC: 19 U/L (ref 0–40)
ANION GAP SERPL CALCULATED.3IONS-SCNC: 13 MMOL/L (ref 7–16)
AST SERPL-CCNC: 22 U/L (ref 0–39)
BASOPHILS ABSOLUTE: 0.04 E9/L (ref 0–0.2)
BASOPHILS RELATIVE PERCENT: 0.6 % (ref 0–2)
BILIRUB SERPL-MCNC: 0.4 MG/DL (ref 0–1.2)
BUN BLDV-MCNC: 15 MG/DL (ref 6–23)
CALCIUM SERPL-MCNC: 9.3 MG/DL (ref 8.6–10.2)
CHLORIDE BLD-SCNC: 106 MMOL/L (ref 98–107)
CHOLESTEROL, FASTING: 195 MG/DL (ref 0–199)
CO2: 22 MMOL/L (ref 22–29)
CREAT SERPL-MCNC: 0.9 MG/DL (ref 0.7–1.2)
EOSINOPHILS ABSOLUTE: 0.25 E9/L (ref 0.05–0.5)
EOSINOPHILS RELATIVE PERCENT: 3.9 % (ref 0–6)
GFR SERPL CREATININE-BSD FRML MDRD: >60 ML/MIN/1.73
GLUCOSE FASTING: 103 MG/DL (ref 74–99)
HBA1C MFR BLD: 5.7 % (ref 4–5.6)
HCT VFR BLD CALC: 42.6 % (ref 37–54)
HDLC SERPL-MCNC: 33 MG/DL
HEMOGLOBIN: 14.4 G/DL (ref 12.5–16.5)
IMMATURE GRANULOCYTES #: 0.02 E9/L
IMMATURE GRANULOCYTES %: 0.3 % (ref 0–5)
LDL CHOLESTEROL CALCULATED: 128 MG/DL (ref 0–99)
LYMPHOCYTES ABSOLUTE: 1.8 E9/L (ref 1.5–4)
LYMPHOCYTES RELATIVE PERCENT: 28.4 % (ref 20–42)
MCH RBC QN AUTO: 30.3 PG (ref 26–35)
MCHC RBC AUTO-ENTMCNC: 33.8 % (ref 32–34.5)
MCV RBC AUTO: 89.7 FL (ref 80–99.9)
MONOCYTES ABSOLUTE: 0.78 E9/L (ref 0.1–0.95)
MONOCYTES RELATIVE PERCENT: 12.3 % (ref 2–12)
NEUTROPHILS ABSOLUTE: 3.44 E9/L (ref 1.8–7.3)
NEUTROPHILS RELATIVE PERCENT: 54.5 % (ref 43–80)
PDW BLD-RTO: 13.1 FL (ref 11.5–15)
PLATELET # BLD: 258 E9/L (ref 130–450)
PMV BLD AUTO: 10.1 FL (ref 7–12)
POTASSIUM SERPL-SCNC: 4.8 MMOL/L (ref 3.5–5)
PROSTATE SPECIFIC ANTIGEN: 1.31 NG/ML (ref 0–4)
RBC # BLD: 4.75 E12/L (ref 3.8–5.8)
SODIUM BLD-SCNC: 141 MMOL/L (ref 132–146)
TOTAL PROTEIN: 7.5 G/DL (ref 6.4–8.3)
TRIGLYCERIDE, FASTING: 171 MG/DL (ref 0–149)
TSH SERPL DL<=0.05 MIU/L-ACNC: 1.65 UIU/ML (ref 0.27–4.2)
VLDLC SERPL CALC-MCNC: 34 MG/DL
WBC # BLD: 6.3 E9/L (ref 4.5–11.5)

## 2022-11-28 LAB — HEPATITIS C ANTIBODY INTERPRETATION: NORMAL

## 2022-11-30 ENCOUNTER — OFFICE VISIT (OUTPATIENT)
Dept: FAMILY MEDICINE CLINIC | Age: 67
End: 2022-11-30
Payer: COMMERCIAL

## 2022-11-30 VITALS
TEMPERATURE: 97.5 F | HEART RATE: 90 BPM | BODY MASS INDEX: 30.81 KG/M2 | DIASTOLIC BLOOD PRESSURE: 82 MMHG | OXYGEN SATURATION: 99 % | SYSTOLIC BLOOD PRESSURE: 138 MMHG | RESPIRATION RATE: 18 BRPM | HEIGHT: 69 IN | WEIGHT: 208 LBS

## 2022-11-30 DIAGNOSIS — R53.83 OTHER FATIGUE: ICD-10-CM

## 2022-11-30 DIAGNOSIS — R73.09 ELEVATED HEMOGLOBIN A1C: ICD-10-CM

## 2022-11-30 DIAGNOSIS — I10 HYPERTENSION, UNSPECIFIED TYPE: Primary | ICD-10-CM

## 2022-11-30 DIAGNOSIS — E78.49 OTHER HYPERLIPIDEMIA: ICD-10-CM

## 2022-11-30 PROCEDURE — 99214 OFFICE O/P EST MOD 30 MIN: CPT | Performed by: INTERNAL MEDICINE

## 2022-11-30 PROCEDURE — 90732 PPSV23 VACC 2 YRS+ SUBQ/IM: CPT | Performed by: INTERNAL MEDICINE

## 2022-11-30 PROCEDURE — 3074F SYST BP LT 130 MM HG: CPT | Performed by: INTERNAL MEDICINE

## 2022-11-30 PROCEDURE — 1123F ACP DISCUSS/DSCN MKR DOCD: CPT | Performed by: INTERNAL MEDICINE

## 2022-11-30 PROCEDURE — 3078F DIAST BP <80 MM HG: CPT | Performed by: INTERNAL MEDICINE

## 2022-11-30 PROCEDURE — 90471 IMMUNIZATION ADMIN: CPT | Performed by: INTERNAL MEDICINE

## 2022-11-30 RX ORDER — ZOSTER VACCINE RECOMBINANT, ADJUVANTED 50 MCG/0.5
0.5 KIT INTRAMUSCULAR SEE ADMIN INSTRUCTIONS
Qty: 0.5 ML | Refills: 0 | Status: SHIPPED | OUTPATIENT
Start: 2022-11-30 | End: 2023-05-29

## 2022-11-30 RX ORDER — LISINOPRIL 10 MG/1
10 TABLET ORAL 2 TIMES DAILY
Qty: 180 TABLET | Refills: 1 | Status: SHIPPED | OUTPATIENT
Start: 2022-11-30

## 2022-11-30 SDOH — ECONOMIC STABILITY: FOOD INSECURITY: WITHIN THE PAST 12 MONTHS, YOU WORRIED THAT YOUR FOOD WOULD RUN OUT BEFORE YOU GOT MONEY TO BUY MORE.: NEVER TRUE

## 2022-11-30 SDOH — ECONOMIC STABILITY: FOOD INSECURITY: WITHIN THE PAST 12 MONTHS, THE FOOD YOU BOUGHT JUST DIDN'T LAST AND YOU DIDN'T HAVE MONEY TO GET MORE.: NEVER TRUE

## 2022-11-30 ASSESSMENT — ENCOUNTER SYMPTOMS
SHORTNESS OF BREATH: 0
EYE DISCHARGE: 0
BLOOD IN STOOL: 0
NAUSEA: 0
ABDOMINAL PAIN: 0

## 2022-11-30 ASSESSMENT — PATIENT HEALTH QUESTIONNAIRE - PHQ9
1. LITTLE INTEREST OR PLEASURE IN DOING THINGS: 0
SUM OF ALL RESPONSES TO PHQ QUESTIONS 1-9: 0
2. FEELING DOWN, DEPRESSED OR HOPELESS: 0
SUM OF ALL RESPONSES TO PHQ9 QUESTIONS 1 & 2: 0
SUM OF ALL RESPONSES TO PHQ QUESTIONS 1-9: 0

## 2022-11-30 ASSESSMENT — SOCIAL DETERMINANTS OF HEALTH (SDOH): HOW HARD IS IT FOR YOU TO PAY FOR THE VERY BASICS LIKE FOOD, HOUSING, MEDICAL CARE, AND HEATING?: NOT HARD AT ALL

## 2022-11-30 NOTE — PATIENT INSTRUCTIONS
The medication list included in this document is our record of what you are currently taking, including any changes that were made at today's visit. If you find any differences when compared to your medications at home, or have any questions that were not answered at your visit, please contact the office. Advise to have ( Fasting) lab test prior to next visit.     Monitor BP and Keep a log, Bring it with next visit

## 2022-11-30 NOTE — PROGRESS NOTES
Chief Complaint   Patient presents with    Follow-up       HPI:  Patient is here for follow-up     Allergy and Medications are reviewed and updated. Past Medical History, Surgical History, and Family History has been reviewed and updated. Review of Systems:  Review of Systems   Constitutional:  Negative for chills and fever. HENT:  Negative for congestion and ear pain. Eyes:  Negative for discharge. Respiratory:  Negative for shortness of breath (No new SOB). Cardiovascular:  Negative for chest pain and leg swelling. Gastrointestinal:  Negative for abdominal pain, blood in stool and nausea. Endocrine: Negative for polydipsia. Genitourinary:  Negative for flank pain and hematuria. Musculoskeletal:  Negative for myalgias and neck pain. Skin:  Negative for rash. Neurological:  Negative for dizziness and seizures. Hematological:  Does not bruise/bleed easily. Psychiatric/Behavioral:  Negative for hallucinations and suicidal ideas. Vitals:    11/30/22 1555   BP: 138/82   Pulse: 90   Resp: 18   Temp: 97.5 °F (36.4 °C)   TempSrc: Temporal   SpO2: 99%   Weight: 208 lb (94.3 kg)   Height: 5' 9\" (1.753 m)       Physical Exam  Vitals reviewed. Constitutional:       Appearance: He is well-developed. HENT:      Head: Normocephalic and atraumatic. Eyes:      Conjunctiva/sclera: Conjunctivae normal.      Pupils: Pupils are equal, round, and reactive to light. Neck:      Vascular: No JVD. Cardiovascular:      Rate and Rhythm: Normal rate and regular rhythm. Pulmonary:      Effort: Pulmonary effort is normal.      Breath sounds: Normal breath sounds. Abdominal:      General: Bowel sounds are normal.      Palpations: Abdomen is soft. Musculoskeletal:         General: Normal range of motion. Skin:     General: Skin is warm and dry. Neurological:      Mental Status: He is alert and oriented to person, place, and time.    Psychiatric:         Behavior: Behavior normal.        Labs :    Lab Results   Component Value Date    WBC 6.3 11/25/2022    HGB 14.4 11/25/2022    HCT 42.6 11/25/2022     11/25/2022    HDL 33 11/25/2022    ALT 19 11/25/2022    AST 22 11/25/2022     11/25/2022    K 4.8 11/25/2022     11/25/2022    CREATININE 0.9 11/25/2022    BUN 15 11/25/2022    CO2 22 11/25/2022    TSH 1.650 11/25/2022    PSA 1.31 11/25/2022    GLUF 103 (H) 11/25/2022    LABA1C 5.7 (H) 11/25/2022     Lab Results   Component Value Date/Time    COLORU Yellow 08/16/2021 03:01 PM    NITRU Negative 08/16/2021 03:01 PM    GLUCOSEU Negative 08/16/2021 03:01 PM    KETUA Negative 08/16/2021 03:01 PM    UROBILINOGEN 0.2 08/16/2021 03:01 PM    BILIRUBINUR Negative 08/16/2021 03:01 PM     Lab Results   Component Value Date/Time    PSA 1.31 11/25/2022 11:27 AM             ASSESSMENT     Patient Active Problem List    Diagnosis Date Noted    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 Hypertension, unspecified type  2. Elevated hemoglobin A1c  3. Other hyperlipidemia  4. Other fatigue  -     Lyme Disease Acute Reflexive Panel; Future       PLAN:     Decline for LDCT  Decline for AAA    Pt and wife requesting to check for lyme because he had \" tick on body 1-2 years ago\"    \" I feel achy in different joints\"     Pt is stable on current medical treatment. Continue current treatment plan    Side effects/Adverse effects/Precautions are reviewed with patient.      Low salt, Low Carb diet an low fat diet  Continue medications as advised and take them regularly  Regular exercises as advised    Discussed natural and expected course of this diagnosis and need to alert me if symptoms do not follow expected course, or if any worse. Smoking cessation if applicable, discussed with patient. Did not start Norvasc     Monitor BP and Keep a log, Bring it with next visit    . There are no Patient Instructions on file for this visit. Return in about 3 months (around 2/28/2023).

## (undated) DEVICE — NEEDLE HYPO 25GA L1.5IN BLU POLYPR HUB S STL REG BVL STR

## (undated) DEVICE — SYRINGE MED 10ML TRNSLUC BRL PLUNG BLK MRK POLYPR CTRL

## (undated) DEVICE — ELECTRODE PT RET AD L9FT HI MOIST COND ADH HYDRGEL CORDED

## (undated) DEVICE — FORCE BIPOLAR: Brand: ENDOWRIST

## (undated) DEVICE — SUTURE ABSRB L6IN L37MM 0 GS-21 GRN 1/2 CIR TAPR PNT NDL VLOCL0306

## (undated) DEVICE — PLUMEPORT LAPAROSCOPIC SMOKE FILTRATION DEVICE: Brand: PLUMEPORT ACTIV

## (undated) DEVICE — CANNULA SEAL

## (undated) DEVICE — SCOPE DAVINCI XI 30 DEG W/CORD

## (undated) DEVICE — GAUZE,SPONGE,4"X4",12PLY,STERILE,LF,2'S: Brand: MEDLINE

## (undated) DEVICE — ARM DRAPE

## (undated) DEVICE — APPLICATOR MEDICATED 26 CC SOLUTION HI LT ORNG CHLORAPREP

## (undated) DEVICE — 3 ML SYRINGE LUER-LOCK TIP: Brand: MONOJECT

## (undated) DEVICE — GARMENT,MEDLINE,DVT,INT,CALF,MED, GEN2: Brand: MEDLINE

## (undated) DEVICE — SHEET DRAPE FULL 70X100

## (undated) DEVICE — PACK SURG LAP CHOLE CUSTOM

## (undated) DEVICE — SET INST DAVINCI XI ACCESSORIES

## (undated) DEVICE — [HIGH FLOW INSUFFLATOR,  DO NOT USE IF PACKAGE IS DAMAGED,  KEEP DRY,  KEEP AWAY FROM SUNLIGHT,  PROTECT FROM HEAT AND RADIOACTIVE SOURCES.]: Brand: PNEUMOSURE

## (undated) DEVICE — TOWEL,OR,DSP,ST,BLUE,STD,6/PK,12PK/CS: Brand: MEDLINE

## (undated) DEVICE — INSUFFLATION NEEDLE TO ESTABLISH PNEUMOPERITONEUM.: Brand: INSUFFLATION NEEDLE

## (undated) DEVICE — MARKER,SKIN,WI/RULER AND LABELS: Brand: MEDLINE

## (undated) DEVICE — Z DISCONTINUED APPLICATOR SURG PREP 0.35OZ 2% CHG 70% ISO ALC W/ HI LT

## (undated) DEVICE — Device: Brand: INSTRUSAFE

## (undated) DEVICE — 6 ML SYRINGE LUER-LOCK TIP: Brand: MONOJECT

## (undated) DEVICE — 3M™ RED DOT™ MONITORING ELECTRODE WITH FOAM TAPE AND STICKY GEL 2560, 50/BAG, 20/CASE, 72/PLT: Brand: RED DOT™

## (undated) DEVICE — WARMER SCP LAP

## (undated) DEVICE — MEGA SUTURECUT ND: Brand: ENDOWRIST

## (undated) DEVICE — ELECTRO LUBE IS A SINGLE PATIENT USE DEVICE THAT IS INTENDED TO BE USED ON ELECTROSURGICAL ELECTRODES TO REDUCE STICKING.: Brand: KEY SURGICAL ELECTRO LUBE

## (undated) DEVICE — BANDAGE ADH W1XL3IN NAT FAB WVN FLX DURABLE N ADH PD SEAL

## (undated) DEVICE — SET ENDO INSTR LAPAROSCOPIC INCISIONAL

## (undated) DEVICE — 12 ML SYRINGE,LUER-LOCK TIP: Brand: MONOJECT

## (undated) DEVICE — TRAY EPI SGL DOSE 18GA NDL CUST AULTMAN HOSP

## (undated) DEVICE — GLOVE ORANGE PI 7 1/2   MSG9075

## (undated) DEVICE — COLUMN DRAPE

## (undated) DEVICE — NEEDLE HYPO 18GA L1.5IN PNK POLYPR HUB S STL THN WALL FILL

## (undated) DEVICE — DOUBLE BASIN SET: Brand: MEDLINE INDUSTRIES, INC.

## (undated) DEVICE — COVER,LIGHT HANDLE,FLX,1/PK: Brand: MEDLINE INDUSTRIES, INC.

## (undated) DEVICE — BLADELESS OBTURATOR: Brand: WECK VISTA

## (undated) DEVICE — GOWN,SIRUS,NONRNF,SETINSLV,XL,20/CS: Brand: MEDLINE